# Patient Record
Sex: MALE | Race: WHITE | NOT HISPANIC OR LATINO | URBAN - METROPOLITAN AREA
[De-identification: names, ages, dates, MRNs, and addresses within clinical notes are randomized per-mention and may not be internally consistent; named-entity substitution may affect disease eponyms.]

---

## 2017-06-13 ENCOUNTER — OUTPATIENT (OUTPATIENT)
Dept: OUTPATIENT SERVICES | Facility: HOSPITAL | Age: 62
LOS: 1 days | End: 2017-06-13
Payer: COMMERCIAL

## 2017-06-13 VITALS
HEART RATE: 89 BPM | SYSTOLIC BLOOD PRESSURE: 116 MMHG | DIASTOLIC BLOOD PRESSURE: 81 MMHG | TEMPERATURE: 99 F | HEIGHT: 70 IN | RESPIRATION RATE: 16 BRPM | WEIGHT: 207.9 LBS

## 2017-06-13 DIAGNOSIS — Z90.49 ACQUIRED ABSENCE OF OTHER SPECIFIED PARTS OF DIGESTIVE TRACT: Chronic | ICD-10-CM

## 2017-06-13 DIAGNOSIS — M16.11 UNILATERAL PRIMARY OSTEOARTHRITIS, RIGHT HIP: ICD-10-CM

## 2017-06-13 DIAGNOSIS — Z86.69 PERSONAL HISTORY OF OTHER DISEASES OF THE NERVOUS SYSTEM AND SENSE ORGANS: Chronic | ICD-10-CM

## 2017-06-13 DIAGNOSIS — Z90.89 ACQUIRED ABSENCE OF OTHER ORGANS: Chronic | ICD-10-CM

## 2017-06-13 DIAGNOSIS — Z01.812 ENCOUNTER FOR PREPROCEDURAL LABORATORY EXAMINATION: ICD-10-CM

## 2017-06-13 LAB
ALBUMIN SERPL ELPH-MCNC: 4 G/DL — SIGNIFICANT CHANGE UP (ref 3.3–5)
ALP SERPL-CCNC: 95 U/L — SIGNIFICANT CHANGE UP (ref 40–120)
ALT FLD-CCNC: 17 U/L — SIGNIFICANT CHANGE UP (ref 12–78)
ANION GAP SERPL CALC-SCNC: 7 MMOL/L — SIGNIFICANT CHANGE UP (ref 5–17)
APTT BLD: 33 SEC — SIGNIFICANT CHANGE UP (ref 27.5–37.4)
AST SERPL-CCNC: 12 U/L — LOW (ref 15–37)
BILIRUB SERPL-MCNC: 0.4 MG/DL — SIGNIFICANT CHANGE UP (ref 0.2–1.2)
BUN SERPL-MCNC: 19 MG/DL — SIGNIFICANT CHANGE UP (ref 7–23)
CALCIUM SERPL-MCNC: 9.5 MG/DL — SIGNIFICANT CHANGE UP (ref 8.5–10.1)
CHLORIDE SERPL-SCNC: 108 MMOL/L — SIGNIFICANT CHANGE UP (ref 96–108)
CO2 SERPL-SCNC: 25 MMOL/L — SIGNIFICANT CHANGE UP (ref 22–31)
CREAT SERPL-MCNC: 0.9 MG/DL — SIGNIFICANT CHANGE UP (ref 0.5–1.3)
GLUCOSE SERPL-MCNC: 103 MG/DL — HIGH (ref 70–99)
HCT VFR BLD CALC: 44.7 % — SIGNIFICANT CHANGE UP (ref 39–50)
HGB BLD-MCNC: 15.4 G/DL — SIGNIFICANT CHANGE UP (ref 13–17)
INR BLD: 1.11 RATIO — SIGNIFICANT CHANGE UP (ref 0.88–1.16)
MCHC RBC-ENTMCNC: 32.8 PG — SIGNIFICANT CHANGE UP (ref 27–34)
MCHC RBC-ENTMCNC: 34.4 GM/DL — SIGNIFICANT CHANGE UP (ref 32–36)
MCV RBC AUTO: 95.2 FL — SIGNIFICANT CHANGE UP (ref 80–100)
MRSA PCR RESULT.: SIGNIFICANT CHANGE UP
PLATELET # BLD AUTO: 244 K/UL — SIGNIFICANT CHANGE UP (ref 150–400)
POTASSIUM SERPL-MCNC: 4.1 MMOL/L — SIGNIFICANT CHANGE UP (ref 3.5–5.3)
POTASSIUM SERPL-SCNC: 4.1 MMOL/L — SIGNIFICANT CHANGE UP (ref 3.5–5.3)
PROT SERPL-MCNC: 7.4 G/DL — SIGNIFICANT CHANGE UP (ref 6–8.3)
PROTHROM AB SERPL-ACNC: 12.1 SEC — SIGNIFICANT CHANGE UP (ref 9.8–12.7)
RBC # BLD: 4.69 M/UL — SIGNIFICANT CHANGE UP (ref 4.2–5.8)
RBC # FLD: 11.2 % — SIGNIFICANT CHANGE UP (ref 10.3–14.5)
S AUREUS DNA NOSE QL NAA+PROBE: SIGNIFICANT CHANGE UP
SODIUM SERPL-SCNC: 140 MMOL/L — SIGNIFICANT CHANGE UP (ref 135–145)
WBC # BLD: 6.5 K/UL — SIGNIFICANT CHANGE UP (ref 3.8–10.5)
WBC # FLD AUTO: 6.5 K/UL — SIGNIFICANT CHANGE UP (ref 3.8–10.5)

## 2017-06-13 PROCEDURE — 93010 ELECTROCARDIOGRAM REPORT: CPT

## 2017-06-13 PROCEDURE — 71020: CPT | Mod: 26

## 2017-06-13 PROCEDURE — 73502 X-RAY EXAM HIP UNI 2-3 VIEWS: CPT | Mod: 26,RT

## 2017-06-13 NOTE — H&P PST ADULT - ASSESSMENT
62 year old male with  Unilateral Primary Osteoarthritis; RIGHT Hip - scheduled for RIGHT Total Hip Replacement with Dr Peter Smalls on 6/29/17

## 2017-06-13 NOTE — H&P PST ADULT - NSANTHOSAYNRD_GEN_A_CORE
No. YARELIS screening performed.  STOP BANG Legend: 0-2 = LOW Risk; 3-4 = INTERMEDIATE Risk; 5-8 = HIGH Risk

## 2017-06-13 NOTE — H&P PST ADULT - MUSCULOSKELETAL COMMENTS
RIGHT Hip Pain , decreased ROM, Diminished Strength - also left torn rotator cuff - notes better ROM since been going to PT decreased ROM and strength in right hip

## 2017-06-13 NOTE — H&P PST ADULT - NEGATIVE ENMT SYMPTOMS
no nasal discharge/no sinus symptoms/no vertigo/no nasal obstruction/no hearing difficulty/no nasal congestion

## 2017-06-13 NOTE — H&P PST ADULT - PMH
Tear of left rotator cuff, unspecified tear extent  LEFT Shoulder  Unilateral primary osteoarthritis, right hip

## 2017-06-13 NOTE — H&P PST ADULT - MUSCULOSKELETAL
details… detailed exam no calf tenderness/diminished strength/no joint warmth/decreased ROM due to pain/no joint erythema

## 2017-06-13 NOTE — H&P PST ADULT - HISTORY OF PRESENT ILLNESS
62 year old male presents for PST prior to RIGHT Total Hip Replacement with Dr Peter Smalls on 6/29/17 - pt notes over the last 8-9 months he has developed arthritis in his right hip which he notes is "bone on bone nothing left in the socket." Notes pain #7/10 on pain scale - uses Tylenol for pain when needed - notes decreased ROM and diminished strength in right hip - started using cane few days ago for ambulation assistance -  following consult with Dr Smalls pt is electing for scheduled procedure.

## 2017-06-13 NOTE — H&P PST ADULT - PAIN COMMENT, PROFILE
pt notes he has been going to PT 6 days a week for hip and shoulder - also notes taking Dicflonec 3 times daily for pain

## 2017-06-13 NOTE — H&P PST ADULT - FAMILY HISTORY
Mother  Still living? No  Family history of dementia, Age at diagnosis: Age Unknown  Hypertension, Age at diagnosis: Age Unknown     Father  Still living? Yes, Estimated age: 81-90  Hypertension, Age at diagnosis: Age Unknown  Family history of dementia, Age at diagnosis: Age Unknown  Family history of Parkinson's disease, Age at diagnosis: Age Unknown

## 2017-06-13 NOTE — H&P PST ADULT - VISION (WITH CORRECTIVE LENSES IF THE PATIENT USUALLY WEARS THEM):
wears RX Eyeglasses for reading/Partially impaired: cannot see medication labels or newsprint, but can see obstacles in path, and the surrounding layout; can count fingers at arm's length

## 2017-06-13 NOTE — H&P PST ADULT - PROBLEM SELECTOR PLAN 1
PST Labs; CBC, CMP, Coags, Type & Screen, EKG, CXR, Nose Culture for MRSA/MSSA, RIGHT Hip Xrays - medical clearance appt scheduled for 6/14/17 with Dr Swain - pre-op instructions as well as pre-op wash instructions given to pt with understanding verbalized - pt encouraged to attend joint replacement class - pt not interested in attending class at this time

## 2017-06-28 PROCEDURE — 87640 STAPH A DNA AMP PROBE: CPT

## 2017-06-28 PROCEDURE — 86901 BLOOD TYPING SEROLOGIC RH(D): CPT

## 2017-06-28 PROCEDURE — 85730 THROMBOPLASTIN TIME PARTIAL: CPT

## 2017-06-28 PROCEDURE — 86920 COMPATIBILITY TEST SPIN: CPT

## 2017-06-28 PROCEDURE — 86900 BLOOD TYPING SEROLOGIC ABO: CPT

## 2017-06-28 PROCEDURE — 86850 RBC ANTIBODY SCREEN: CPT

## 2017-06-28 PROCEDURE — 87641 MR-STAPH DNA AMP PROBE: CPT

## 2017-06-28 PROCEDURE — 85610 PROTHROMBIN TIME: CPT

## 2017-06-28 PROCEDURE — 93005 ELECTROCARDIOGRAM TRACING: CPT

## 2017-06-28 PROCEDURE — 85027 COMPLETE CBC AUTOMATED: CPT

## 2017-06-28 PROCEDURE — G0463: CPT

## 2017-06-28 PROCEDURE — 80053 COMPREHEN METABOLIC PANEL: CPT

## 2017-06-28 PROCEDURE — 71046 X-RAY EXAM CHEST 2 VIEWS: CPT

## 2017-06-28 PROCEDURE — 73502 X-RAY EXAM HIP UNI 2-3 VIEWS: CPT

## 2017-06-28 RX ORDER — SODIUM CHLORIDE 9 MG/ML
1000 INJECTION, SOLUTION INTRAVENOUS
Qty: 0 | Refills: 0 | Status: DISCONTINUED | OUTPATIENT
Start: 2017-06-29 | End: 2017-06-29

## 2017-06-29 ENCOUNTER — INPATIENT (INPATIENT)
Facility: HOSPITAL | Age: 62
LOS: 3 days | Discharge: NURSING, REHAB OR LONG-TERM CARE | DRG: 470 | End: 2017-07-03
Attending: HOSPITALIST | Admitting: SPECIALIST
Payer: COMMERCIAL

## 2017-06-29 VITALS
HEART RATE: 90 BPM | WEIGHT: 207.9 LBS | SYSTOLIC BLOOD PRESSURE: 130 MMHG | TEMPERATURE: 98 F | RESPIRATION RATE: 19 BRPM | DIASTOLIC BLOOD PRESSURE: 88 MMHG | OXYGEN SATURATION: 95 % | HEIGHT: 70 IN

## 2017-06-29 DIAGNOSIS — Z90.49 ACQUIRED ABSENCE OF OTHER SPECIFIED PARTS OF DIGESTIVE TRACT: Chronic | ICD-10-CM

## 2017-06-29 DIAGNOSIS — Z86.69 PERSONAL HISTORY OF OTHER DISEASES OF THE NERVOUS SYSTEM AND SENSE ORGANS: Chronic | ICD-10-CM

## 2017-06-29 DIAGNOSIS — M16.11 UNILATERAL PRIMARY OSTEOARTHRITIS, RIGHT HIP: ICD-10-CM

## 2017-06-29 DIAGNOSIS — Z90.89 ACQUIRED ABSENCE OF OTHER ORGANS: Chronic | ICD-10-CM

## 2017-06-29 LAB
ABO RH CONFIRMATION: SIGNIFICANT CHANGE UP
ANION GAP SERPL CALC-SCNC: 6 MMOL/L — SIGNIFICANT CHANGE UP (ref 5–17)
BUN SERPL-MCNC: 12 MG/DL — SIGNIFICANT CHANGE UP (ref 7–23)
CALCIUM SERPL-MCNC: 8.2 MG/DL — LOW (ref 8.5–10.1)
CHLORIDE SERPL-SCNC: 107 MMOL/L — SIGNIFICANT CHANGE UP (ref 96–108)
CO2 SERPL-SCNC: 25 MMOL/L — SIGNIFICANT CHANGE UP (ref 22–31)
CREAT SERPL-MCNC: 1.2 MG/DL — SIGNIFICANT CHANGE UP (ref 0.5–1.3)
GLUCOSE SERPL-MCNC: 123 MG/DL — HIGH (ref 70–99)
HCT VFR BLD CALC: 42.3 % — SIGNIFICANT CHANGE UP (ref 39–50)
HGB BLD-MCNC: 14.3 G/DL — SIGNIFICANT CHANGE UP (ref 13–17)
MCHC RBC-ENTMCNC: 32.4 PG — SIGNIFICANT CHANGE UP (ref 27–34)
MCHC RBC-ENTMCNC: 33.8 GM/DL — SIGNIFICANT CHANGE UP (ref 32–36)
MCV RBC AUTO: 95.9 FL — SIGNIFICANT CHANGE UP (ref 80–100)
PLATELET # BLD AUTO: 246 K/UL — SIGNIFICANT CHANGE UP (ref 150–400)
POTASSIUM SERPL-MCNC: 4.7 MMOL/L — SIGNIFICANT CHANGE UP (ref 3.5–5.3)
POTASSIUM SERPL-SCNC: 4.7 MMOL/L — SIGNIFICANT CHANGE UP (ref 3.5–5.3)
RBC # BLD: 4.41 M/UL — SIGNIFICANT CHANGE UP (ref 4.2–5.8)
RBC # FLD: 11.5 % — SIGNIFICANT CHANGE UP (ref 10.3–14.5)
SODIUM SERPL-SCNC: 138 MMOL/L — SIGNIFICANT CHANGE UP (ref 135–145)
WBC # BLD: 13.4 K/UL — HIGH (ref 3.8–10.5)
WBC # FLD AUTO: 13.4 K/UL — HIGH (ref 3.8–10.5)

## 2017-06-29 PROCEDURE — 88311 DECALCIFY TISSUE: CPT | Mod: 26

## 2017-06-29 PROCEDURE — 88305 TISSUE EXAM BY PATHOLOGIST: CPT | Mod: 26

## 2017-06-29 PROCEDURE — 72170 X-RAY EXAM OF PELVIS: CPT | Mod: 26

## 2017-06-29 RX ORDER — OXYCODONE HYDROCHLORIDE 5 MG/1
10 TABLET ORAL EVERY 4 HOURS
Qty: 0 | Refills: 0 | Status: DISCONTINUED | OUTPATIENT
Start: 2017-06-29 | End: 2017-07-03

## 2017-06-29 RX ORDER — SENNA PLUS 8.6 MG/1
2 TABLET ORAL AT BEDTIME
Qty: 0 | Refills: 0 | Status: DISCONTINUED | OUTPATIENT
Start: 2017-06-29 | End: 2017-07-03

## 2017-06-29 RX ORDER — ACETAMINOPHEN 500 MG
650 TABLET ORAL EVERY 6 HOURS
Qty: 0 | Refills: 0 | Status: DISCONTINUED | OUTPATIENT
Start: 2017-06-29 | End: 2017-07-03

## 2017-06-29 RX ORDER — OXYCODONE HYDROCHLORIDE 5 MG/1
5 TABLET ORAL EVERY 4 HOURS
Qty: 0 | Refills: 0 | Status: DISCONTINUED | OUTPATIENT
Start: 2017-06-29 | End: 2017-07-03

## 2017-06-29 RX ORDER — HYDROMORPHONE HYDROCHLORIDE 2 MG/ML
0.5 INJECTION INTRAMUSCULAR; INTRAVENOUS; SUBCUTANEOUS
Qty: 0 | Refills: 0 | Status: DISCONTINUED | OUTPATIENT
Start: 2017-06-29 | End: 2017-06-29

## 2017-06-29 RX ORDER — CEFAZOLIN SODIUM 1 G
2000 VIAL (EA) INJECTION EVERY 8 HOURS
Qty: 0 | Refills: 0 | Status: COMPLETED | OUTPATIENT
Start: 2017-06-29 | End: 2017-06-30

## 2017-06-29 RX ORDER — CYCLOBENZAPRINE HYDROCHLORIDE 10 MG/1
5 TABLET, FILM COATED ORAL THREE TIMES A DAY
Qty: 0 | Refills: 0 | Status: DISCONTINUED | OUTPATIENT
Start: 2017-06-29 | End: 2017-06-30

## 2017-06-29 RX ORDER — BENZOCAINE AND MENTHOL 5; 1 G/100ML; G/100ML
1 LIQUID ORAL
Qty: 0 | Refills: 0 | Status: DISCONTINUED | OUTPATIENT
Start: 2017-06-29 | End: 2017-07-03

## 2017-06-29 RX ORDER — CEFAZOLIN SODIUM 1 G
2000 VIAL (EA) INJECTION ONCE
Qty: 0 | Refills: 0 | Status: COMPLETED | OUTPATIENT
Start: 2017-06-29 | End: 2017-06-29

## 2017-06-29 RX ORDER — ONDANSETRON 8 MG/1
4 TABLET, FILM COATED ORAL EVERY 6 HOURS
Qty: 0 | Refills: 0 | Status: DISCONTINUED | OUTPATIENT
Start: 2017-06-29 | End: 2017-07-03

## 2017-06-29 RX ORDER — HYDROMORPHONE HYDROCHLORIDE 2 MG/ML
0.5 INJECTION INTRAMUSCULAR; INTRAVENOUS; SUBCUTANEOUS
Qty: 0 | Refills: 0 | Status: DISCONTINUED | OUTPATIENT
Start: 2017-06-29 | End: 2017-07-03

## 2017-06-29 RX ORDER — BUPIVACAINE 13.3 MG/ML
20 INJECTION, SUSPENSION, LIPOSOMAL INFILTRATION ONCE
Qty: 0 | Refills: 0 | Status: DISCONTINUED | OUTPATIENT
Start: 2017-06-29 | End: 2017-06-29

## 2017-06-29 RX ORDER — DOCUSATE SODIUM 100 MG
100 CAPSULE ORAL THREE TIMES A DAY
Qty: 0 | Refills: 0 | Status: DISCONTINUED | OUTPATIENT
Start: 2017-06-29 | End: 2017-07-03

## 2017-06-29 RX ORDER — ONDANSETRON 8 MG/1
4 TABLET, FILM COATED ORAL ONCE
Qty: 0 | Refills: 0 | Status: DISCONTINUED | OUTPATIENT
Start: 2017-06-29 | End: 2017-06-29

## 2017-06-29 RX ORDER — ENOXAPARIN SODIUM 100 MG/ML
40 INJECTION SUBCUTANEOUS EVERY 24 HOURS
Qty: 0 | Refills: 0 | Status: DISCONTINUED | OUTPATIENT
Start: 2017-06-30 | End: 2017-07-03

## 2017-06-29 RX ORDER — MEPERIDINE HYDROCHLORIDE 50 MG/ML
12.5 INJECTION INTRAMUSCULAR; INTRAVENOUS; SUBCUTANEOUS
Qty: 0 | Refills: 0 | Status: DISCONTINUED | OUTPATIENT
Start: 2017-06-29 | End: 2017-06-29

## 2017-06-29 RX ORDER — SODIUM CHLORIDE 9 MG/ML
1000 INJECTION, SOLUTION INTRAVENOUS
Qty: 0 | Refills: 0 | Status: DISCONTINUED | OUTPATIENT
Start: 2017-06-29 | End: 2017-06-29

## 2017-06-29 RX ORDER — METOCLOPRAMIDE HCL 10 MG
10 TABLET ORAL ONCE
Qty: 0 | Refills: 0 | Status: DISCONTINUED | OUTPATIENT
Start: 2017-06-29 | End: 2017-06-29

## 2017-06-29 RX ORDER — OXYCODONE HYDROCHLORIDE 5 MG/1
5 TABLET ORAL EVERY 4 HOURS
Qty: 0 | Refills: 0 | Status: DISCONTINUED | OUTPATIENT
Start: 2017-06-29 | End: 2017-06-29

## 2017-06-29 RX ORDER — SODIUM CHLORIDE 9 MG/ML
1000 INJECTION INTRAMUSCULAR; INTRAVENOUS; SUBCUTANEOUS
Qty: 0 | Refills: 0 | Status: DISCONTINUED | OUTPATIENT
Start: 2017-06-29 | End: 2017-06-30

## 2017-06-29 RX ORDER — ACETAMINOPHEN 500 MG
1000 TABLET ORAL ONCE
Qty: 0 | Refills: 0 | Status: COMPLETED | OUTPATIENT
Start: 2017-06-29 | End: 2017-06-29

## 2017-06-29 RX ORDER — DIPHENHYDRAMINE HCL 50 MG
25 CAPSULE ORAL EVERY 4 HOURS
Qty: 0 | Refills: 0 | Status: DISCONTINUED | OUTPATIENT
Start: 2017-06-29 | End: 2017-07-03

## 2017-06-29 RX ORDER — POLYETHYLENE GLYCOL 3350 17 G/17G
17 POWDER, FOR SOLUTION ORAL DAILY
Qty: 0 | Refills: 0 | Status: DISCONTINUED | OUTPATIENT
Start: 2017-06-29 | End: 2017-07-03

## 2017-06-29 RX ORDER — MAGNESIUM HYDROXIDE 400 MG/1
30 TABLET, CHEWABLE ORAL DAILY
Qty: 0 | Refills: 0 | Status: DISCONTINUED | OUTPATIENT
Start: 2017-06-29 | End: 2017-07-03

## 2017-06-29 RX ORDER — DIPHENHYDRAMINE HCL 50 MG
50 CAPSULE ORAL AT BEDTIME
Qty: 0 | Refills: 0 | Status: DISCONTINUED | OUTPATIENT
Start: 2017-06-29 | End: 2017-07-03

## 2017-06-29 RX ADMIN — OXYCODONE HYDROCHLORIDE 10 MILLIGRAM(S): 5 TABLET ORAL at 21:30

## 2017-06-29 RX ADMIN — Medication 100 MILLIGRAM(S): at 17:49

## 2017-06-29 RX ADMIN — OXYCODONE HYDROCHLORIDE 10 MILLIGRAM(S): 5 TABLET ORAL at 22:00

## 2017-06-29 RX ADMIN — HYDROMORPHONE HYDROCHLORIDE 0.5 MILLIGRAM(S): 2 INJECTION INTRAMUSCULAR; INTRAVENOUS; SUBCUTANEOUS at 14:40

## 2017-06-29 RX ADMIN — Medication 1 TABLET(S): at 21:30

## 2017-06-29 RX ADMIN — SODIUM CHLORIDE 75 MILLILITER(S): 9 INJECTION, SOLUTION INTRAVENOUS at 14:18

## 2017-06-29 RX ADMIN — HYDROMORPHONE HYDROCHLORIDE 0.5 MILLIGRAM(S): 2 INJECTION INTRAMUSCULAR; INTRAVENOUS; SUBCUTANEOUS at 14:51

## 2017-06-29 RX ADMIN — HYDROMORPHONE HYDROCHLORIDE 0.5 MILLIGRAM(S): 2 INJECTION INTRAMUSCULAR; INTRAVENOUS; SUBCUTANEOUS at 14:29

## 2017-06-29 RX ADMIN — SODIUM CHLORIDE 75 MILLILITER(S): 9 INJECTION, SOLUTION INTRAVENOUS at 08:32

## 2017-06-29 RX ADMIN — SODIUM CHLORIDE 75 MILLILITER(S): 9 INJECTION INTRAMUSCULAR; INTRAVENOUS; SUBCUTANEOUS at 15:35

## 2017-06-29 RX ADMIN — HYDROMORPHONE HYDROCHLORIDE 0.5 MILLIGRAM(S): 2 INJECTION INTRAMUSCULAR; INTRAVENOUS; SUBCUTANEOUS at 15:08

## 2017-06-29 NOTE — PROGRESS NOTE ADULT - SUBJECTIVE AND OBJECTIVE BOX
pt s+e, pain control    Vital Signs Last 24 Hrs  T(C): 36.8 (29 Jun 2017 15:58), Max: 36.8 (29 Jun 2017 14:00)  T(F): 98.2 (29 Jun 2017 15:58), Max: 98.2 (29 Jun 2017 14:00)  HR: 75 (29 Jun 2017 15:58) (72 - 94)  BP: 107/71 (29 Jun 2017 15:58) (107/71 - 130/88)  BP(mean): --  RR: 16 (29 Jun 2017 15:58) (13 - 19)  SpO2: 98% (29 Jun 2017 15:58) (95% - 98%)    PE:  dressing c/d/i  ehl/fhl/ta/gs intact  silt l3-s1  compartment soft  +abduction pillow    AP 62yM s/p R SAMIR  -pain control  -dvt ppx  -wbat, pt  -abduction pillow  -posterior precautions

## 2017-06-29 NOTE — PATIENT PROFILE ADULT. - VISION (WITH CORRECTIVE LENSES IF THE PATIENT USUALLY WEARS THEM):
Partially impaired: cannot see medication labels or newsprint, but can see obstacles in path, and the surrounding layout; can count fingers at arm's length/wears RX Eyeglasses for reading

## 2017-06-30 LAB
ANION GAP SERPL CALC-SCNC: 8 MMOL/L — SIGNIFICANT CHANGE UP (ref 5–17)
BUN SERPL-MCNC: 12 MG/DL — SIGNIFICANT CHANGE UP (ref 7–23)
CALCIUM SERPL-MCNC: 8.4 MG/DL — LOW (ref 8.5–10.1)
CHLORIDE SERPL-SCNC: 104 MMOL/L — SIGNIFICANT CHANGE UP (ref 96–108)
CO2 SERPL-SCNC: 27 MMOL/L — SIGNIFICANT CHANGE UP (ref 22–31)
CREAT SERPL-MCNC: 1.1 MG/DL — SIGNIFICANT CHANGE UP (ref 0.5–1.3)
GLUCOSE SERPL-MCNC: 105 MG/DL — HIGH (ref 70–99)
HCT VFR BLD CALC: 37.2 % — LOW (ref 39–50)
HGB BLD-MCNC: 12.6 G/DL — LOW (ref 13–17)
MCHC RBC-ENTMCNC: 33 PG — SIGNIFICANT CHANGE UP (ref 27–34)
MCHC RBC-ENTMCNC: 34 GM/DL — SIGNIFICANT CHANGE UP (ref 32–36)
MCV RBC AUTO: 97.1 FL — SIGNIFICANT CHANGE UP (ref 80–100)
PLATELET # BLD AUTO: 226 K/UL — SIGNIFICANT CHANGE UP (ref 150–400)
POTASSIUM SERPL-MCNC: 4 MMOL/L — SIGNIFICANT CHANGE UP (ref 3.5–5.3)
POTASSIUM SERPL-SCNC: 4 MMOL/L — SIGNIFICANT CHANGE UP (ref 3.5–5.3)
RBC # BLD: 3.83 M/UL — LOW (ref 4.2–5.8)
RBC # FLD: 11.8 % — SIGNIFICANT CHANGE UP (ref 10.3–14.5)
SODIUM SERPL-SCNC: 139 MMOL/L — SIGNIFICANT CHANGE UP (ref 135–145)
WBC # BLD: 13.5 K/UL — HIGH (ref 3.8–10.5)
WBC # FLD AUTO: 13.5 K/UL — HIGH (ref 3.8–10.5)

## 2017-06-30 RX ORDER — DIAZEPAM 5 MG
5 TABLET ORAL EVERY 8 HOURS
Qty: 0 | Refills: 0 | Status: DISCONTINUED | OUTPATIENT
Start: 2017-06-30 | End: 2017-07-03

## 2017-06-30 RX ORDER — DOCUSATE SODIUM 100 MG
1 CAPSULE ORAL
Qty: 30 | Refills: 0 | OUTPATIENT
Start: 2017-06-30 | End: 2017-07-10

## 2017-06-30 RX ORDER — OXYCODONE HYDROCHLORIDE 5 MG/1
1 TABLET ORAL
Qty: 28 | Refills: 0 | OUTPATIENT
Start: 2017-06-30 | End: 2017-07-07

## 2017-06-30 RX ORDER — ENOXAPARIN SODIUM 100 MG/ML
40 INJECTION SUBCUTANEOUS
Qty: 30 | Refills: 0 | OUTPATIENT
Start: 2017-06-30 | End: 2017-07-30

## 2017-06-30 RX ORDER — DICLOFENAC SODIUM 75 MG/1
1 TABLET, DELAYED RELEASE ORAL
Qty: 0 | Refills: 0 | COMMUNITY

## 2017-06-30 RX ADMIN — OXYCODONE HYDROCHLORIDE 10 MILLIGRAM(S): 5 TABLET ORAL at 08:54

## 2017-06-30 RX ADMIN — Medication 100 MILLIGRAM(S): at 01:37

## 2017-06-30 RX ADMIN — HYDROMORPHONE HYDROCHLORIDE 0.5 MILLIGRAM(S): 2 INJECTION INTRAMUSCULAR; INTRAVENOUS; SUBCUTANEOUS at 21:37

## 2017-06-30 RX ADMIN — OXYCODONE HYDROCHLORIDE 10 MILLIGRAM(S): 5 TABLET ORAL at 06:00

## 2017-06-30 RX ADMIN — Medication 1 TABLET(S): at 05:05

## 2017-06-30 RX ADMIN — Medication 650 MILLIGRAM(S): at 15:56

## 2017-06-30 RX ADMIN — HYDROMORPHONE HYDROCHLORIDE 0.5 MILLIGRAM(S): 2 INJECTION INTRAMUSCULAR; INTRAVENOUS; SUBCUTANEOUS at 13:20

## 2017-06-30 RX ADMIN — OXYCODONE HYDROCHLORIDE 10 MILLIGRAM(S): 5 TABLET ORAL at 09:30

## 2017-06-30 RX ADMIN — Medication 100 MILLIGRAM(S): at 21:23

## 2017-06-30 RX ADMIN — HYDROMORPHONE HYDROCHLORIDE 0.5 MILLIGRAM(S): 2 INJECTION INTRAMUSCULAR; INTRAVENOUS; SUBCUTANEOUS at 21:22

## 2017-06-30 RX ADMIN — Medication 1 TABLET(S): at 13:17

## 2017-06-30 RX ADMIN — Medication 5 MILLIGRAM(S): at 23:56

## 2017-06-30 RX ADMIN — HYDROMORPHONE HYDROCHLORIDE 0.5 MILLIGRAM(S): 2 INJECTION INTRAMUSCULAR; INTRAVENOUS; SUBCUTANEOUS at 12:41

## 2017-06-30 RX ADMIN — Medication 100 MILLIGRAM(S): at 13:16

## 2017-06-30 RX ADMIN — Medication 1 TABLET(S): at 21:23

## 2017-06-30 RX ADMIN — HYDROMORPHONE HYDROCHLORIDE 0.5 MILLIGRAM(S): 2 INJECTION INTRAMUSCULAR; INTRAVENOUS; SUBCUTANEOUS at 17:50

## 2017-06-30 RX ADMIN — Medication 100 MILLIGRAM(S): at 05:05

## 2017-06-30 RX ADMIN — ENOXAPARIN SODIUM 40 MILLIGRAM(S): 100 INJECTION SUBCUTANEOUS at 13:16

## 2017-06-30 RX ADMIN — Medication 1 TABLET(S): at 11:50

## 2017-06-30 RX ADMIN — POLYETHYLENE GLYCOL 3350 17 GRAM(S): 17 POWDER, FOR SOLUTION ORAL at 11:50

## 2017-06-30 RX ADMIN — OXYCODONE HYDROCHLORIDE 10 MILLIGRAM(S): 5 TABLET ORAL at 05:04

## 2017-06-30 NOTE — PROGRESS NOTE ADULT - SUBJECTIVE AND OBJECTIVE BOX
Pt S&E. Pain controlled. No acute events overnight    Vital Signs Last 24 Hrs  T(C): 36.4 (06-30-17 @ 04:49), Max: 36.8 (06-29-17 @ 14:00)  T(F): 97.5 (06-30-17 @ 04:49), Max: 98.2 (06-29-17 @ 14:00)  HR: 80 (06-30-17 @ 04:49) (70 - 94)  BP: 103/70 (06-30-17 @ 04:49) (99/63 - 130/88)  BP(mean): --  RR: 16 (06-30-17 @ 04:49) (13 - 19)  SpO2: 96% (06-30-17 @ 04:49) (95% - 98%)    Gen: NAD  RLE:  Dsg c/d/i  SILT L2-S1  +EHL/FHL/TA/Gastroc  DP+  Soft compartments, - calf ttp

## 2017-06-30 NOTE — DISCHARGE NOTE ADULT - MEDICATION SUMMARY - MEDICATIONS TO TAKE
I will START or STAY ON the medications listed below when I get home from the hospital:    acetaminophen-oxyCODONE 325 mg-5 mg oral tablet  -- 1 tab(s) by mouth every 6 hours, As Needed MDD:4 for pain  -- Caution federal law prohibits the transfer of this drug to any person other  than the person for whom it was prescribed.  May cause drowsiness.  Alcohol may intensify this effect.  Use care when operating dangerous machinery.  This prescription cannot be refilled.  This product contains acetaminophen.  Do not use  with any other product containing acetaminophen to prevent possible liver damage.  Using more of this medication than prescribed may cause serious breathing problems.    -- Indication: For Prn pain    enoxaparin 40 mg/0.4 mL injectable solution  -- 40 milligram(s) injectable once a day MDD:1 x30 days  -- It is very important that you take or use this exactly as directed.  Do not skip doses or discontinue unless directed by your doctor.    -- Indication: For dvt ppx    garlic oral tablet  -- 1 tab(s) by mouth once a day  -- Indication: For Per pmd    Colace sodium 100 mg oral capsule  -- 1 cap(s) by mouth 3 times a day, As Needed constipation  -- Medication should be taken with plenty of water.    -- Indication: For Prn constipation    Centrum oral tablet  -- 1 tab(s) by mouth once a day  -- Indication: For Per pmd I will START or STAY ON the medications listed below when I get home from the hospital:    Percocet 5/325 oral tablet  -- 1 tab(s) by mouth every 6 hours, As Needed pain  -- Indication: For Pain    Lovenox 40 mg/0.4 mL injectable solution  -- 1 unit(s) injectable once a day for 30 days for DVT prophylaxis.  Do not skip doses.  -- Indication: For DVT ppx    garlic oral tablet  -- 1 tab(s) by mouth once a day  -- Indication: For Per pmd    Colace sodium 100 mg oral capsule  -- 1 cap(s) by mouth 3 times a day, As Needed constipation  -- Medication should be taken with plenty of water.    -- Indication: For Prn constipation    Centrum oral tablet  -- 1 tab(s) by mouth once a day  -- Indication: For Per pmd

## 2017-06-30 NOTE — DISCHARGE NOTE ADULT - PATIENT PORTAL LINK FT
“You can access the [employer] Patient Portal, offered by [facility], by registering with the following website: [email or web address]”

## 2017-06-30 NOTE — DISCHARGE NOTE ADULT - HOSPITAL COURSE
The patient is a 62F status post elective total hip arthroplasty to the right hip after failing outpatient nonoperative conservative management.  Patient presented to [facility] after being medically cleared for an elective surgical procedure. The patient was taken to the operating room on date mentioned above. Prophylactic antibiotics were started before the procedure and continued for 24 hours.  There were no complications during the procedure and patient tolerated the procedure well.  The patient was transferred to recovery room in stable condition and subsequently to surgical floor.  Patient was placed on Lovenox for anticoagulation.  All home medications were continued.  The patient received physical therapy daily and daily labs were followed.  The dressing was kept clean, dry, and intact. The rest of the hospital stay was unremarkable. The patient is a 62M status post elective total hip arthroplasty to the right hip after failing outpatient nonoperative conservative management.  Patient presented to [facility] after being medically cleared for an elective surgical procedure. The patient was taken to the operating room on date mentioned above. Prophylactic antibiotics were started before the procedure and continued for 24 hours.  There were no complications during the procedure and patient tolerated the procedure well.  The patient was transferred to recovery room in stable condition and subsequently to surgical floor.  Patient was placed on Lovenox for anticoagulation.  All home medications were continued.  The patient received physical therapy daily and daily labs were followed.  The dressing was kept clean, dry, and intact. The rest of the hospital stay was unremarkable.

## 2017-06-30 NOTE — DISCHARGE NOTE ADULT - PLAN OF CARE
Return to baseline ADLs 1.	Pain control  2.	Walking with full weight bearing as tolerated, with assistive devices as needed.  3.	DVT prophylaxis  4.	Physical therapy as needed  5.	Follow up with Dr. Smalls as outpatient in 10-14 Days after discharge from the hospital or rehab. Call office for appointment.  6.	Remove old and place new Aquacel bandage every 7 days until staples removed. Remove staples post-op day 14.  7.	Ice and elevate affected area as needed  8.	Keep dressing clean, dry and intact  9.	Posterior hip precautions - Abduction pillow while seated or supine. Do not bend hip past 90 degrees. Do not turn knee/foot inward. Do not cross leg past middle of your body

## 2017-06-30 NOTE — DISCHARGE NOTE ADULT - CARE PLAN
Principal Discharge DX:	Unilateral primary osteoarthritis, right hip  Goal:	Return to baseline ADLs  Instructions for follow-up, activity and diet:	1.	Pain control  2.	Walking with full weight bearing as tolerated, with assistive devices as needed.  3.	DVT prophylaxis  4.	Physical therapy as needed  5.	Follow up with Dr. Smalls as outpatient in 10-14 Days after discharge from the hospital or rehab. Call office for appointment.  6.	Remove old and place new Aquacel bandage every 7 days until staples removed. Remove staples post-op day 14.  7.	Ice and elevate affected area as needed  8.	Keep dressing clean, dry and intact  9.	Posterior hip precautions - Abduction pillow while seated or supine. Do not bend hip past 90 degrees. Do not turn knee/foot inward. Do not cross leg past middle of your body

## 2017-06-30 NOTE — DISCHARGE NOTE ADULT - CARE PROVIDER_API CALL
Peter Smalls), Orthopaedic Surgery Surgery  [address] [place] [address]  Phone: [phone]  Fax: [phone]

## 2017-07-01 LAB
ANION GAP SERPL CALC-SCNC: 6 MMOL/L — SIGNIFICANT CHANGE UP (ref 5–17)
ANION GAP SERPL CALC-SCNC: 6 MMOL/L — SIGNIFICANT CHANGE UP (ref 5–17)
BASOPHILS # BLD AUTO: 0.1 K/UL — SIGNIFICANT CHANGE UP (ref 0–0.2)
BASOPHILS NFR BLD AUTO: 0.6 % — SIGNIFICANT CHANGE UP (ref 0–2)
BUN SERPL-MCNC: 12 MG/DL — SIGNIFICANT CHANGE UP (ref 7–23)
BUN SERPL-MCNC: 9 MG/DL — SIGNIFICANT CHANGE UP (ref 7–23)
CALCIUM SERPL-MCNC: 8.3 MG/DL — LOW (ref 8.5–10.1)
CALCIUM SERPL-MCNC: 8.5 MG/DL — SIGNIFICANT CHANGE UP (ref 8.5–10.1)
CHLORIDE SERPL-SCNC: 101 MMOL/L — SIGNIFICANT CHANGE UP (ref 96–108)
CHLORIDE SERPL-SCNC: 103 MMOL/L — SIGNIFICANT CHANGE UP (ref 96–108)
CK MB BLD-MCNC: 0.1 % — SIGNIFICANT CHANGE UP (ref 0–3.5)
CK MB CFR SERPL CALC: 0.7 NG/ML — SIGNIFICANT CHANGE UP (ref 0–3.6)
CK SERPL-CCNC: 695 U/L — HIGH (ref 26–308)
CO2 SERPL-SCNC: 28 MMOL/L — SIGNIFICANT CHANGE UP (ref 22–31)
CO2 SERPL-SCNC: 29 MMOL/L — SIGNIFICANT CHANGE UP (ref 22–31)
CREAT SERPL-MCNC: 0.77 MG/DL — SIGNIFICANT CHANGE UP (ref 0.5–1.3)
CREAT SERPL-MCNC: 0.8 MG/DL — SIGNIFICANT CHANGE UP (ref 0.5–1.3)
D DIMER BLD IA.RAPID-MCNC: 395 NG/ML DDU — HIGH
EOSINOPHIL # BLD AUTO: 0 K/UL — SIGNIFICANT CHANGE UP (ref 0–0.5)
EOSINOPHIL NFR BLD AUTO: 0.2 % — SIGNIFICANT CHANGE UP (ref 0–6)
GLUCOSE SERPL-MCNC: 105 MG/DL — HIGH (ref 70–99)
GLUCOSE SERPL-MCNC: 99 MG/DL — SIGNIFICANT CHANGE UP (ref 70–99)
HCT VFR BLD CALC: 36.2 % — LOW (ref 39–50)
HCT VFR BLD CALC: 37.6 % — LOW (ref 39–50)
HGB BLD-MCNC: 12.5 G/DL — LOW (ref 13–17)
HGB BLD-MCNC: 12.7 G/DL — LOW (ref 13–17)
LYMPHOCYTES # BLD AUTO: 2.5 K/UL — SIGNIFICANT CHANGE UP (ref 1–3.3)
LYMPHOCYTES # BLD AUTO: 24.8 % — SIGNIFICANT CHANGE UP (ref 13–44)
MAGNESIUM SERPL-MCNC: 1.9 MG/DL — SIGNIFICANT CHANGE UP (ref 1.6–2.6)
MCHC RBC-ENTMCNC: 32.6 PG — SIGNIFICANT CHANGE UP (ref 27–34)
MCHC RBC-ENTMCNC: 32.8 PG — SIGNIFICANT CHANGE UP (ref 27–34)
MCHC RBC-ENTMCNC: 33.8 GM/DL — SIGNIFICANT CHANGE UP (ref 32–36)
MCHC RBC-ENTMCNC: 34.5 GM/DL — SIGNIFICANT CHANGE UP (ref 32–36)
MCV RBC AUTO: 95 FL — SIGNIFICANT CHANGE UP (ref 80–100)
MCV RBC AUTO: 96.4 FL — SIGNIFICANT CHANGE UP (ref 80–100)
MONOCYTES # BLD AUTO: 1.2 K/UL — HIGH (ref 0–0.9)
MONOCYTES NFR BLD AUTO: 12 % — HIGH (ref 1–9)
NEUTROPHILS # BLD AUTO: 6.4 K/UL — SIGNIFICANT CHANGE UP (ref 1.8–7.4)
NEUTROPHILS NFR BLD AUTO: 62.4 % — SIGNIFICANT CHANGE UP (ref 43–77)
PHOSPHATE SERPL-MCNC: 2.2 MG/DL — LOW (ref 2.5–4.5)
PLATELET # BLD AUTO: 212 K/UL — SIGNIFICANT CHANGE UP (ref 150–400)
PLATELET # BLD AUTO: 216 K/UL — SIGNIFICANT CHANGE UP (ref 150–400)
POTASSIUM SERPL-MCNC: 3.7 MMOL/L — SIGNIFICANT CHANGE UP (ref 3.5–5.3)
POTASSIUM SERPL-MCNC: 3.8 MMOL/L — SIGNIFICANT CHANGE UP (ref 3.5–5.3)
POTASSIUM SERPL-SCNC: 3.7 MMOL/L — SIGNIFICANT CHANGE UP (ref 3.5–5.3)
POTASSIUM SERPL-SCNC: 3.8 MMOL/L — SIGNIFICANT CHANGE UP (ref 3.5–5.3)
RBC # BLD: 3.82 M/UL — LOW (ref 4.2–5.8)
RBC # BLD: 3.9 M/UL — LOW (ref 4.2–5.8)
RBC # FLD: 11.5 % — SIGNIFICANT CHANGE UP (ref 10.3–14.5)
RBC # FLD: 11.6 % — SIGNIFICANT CHANGE UP (ref 10.3–14.5)
SODIUM SERPL-SCNC: 136 MMOL/L — SIGNIFICANT CHANGE UP (ref 135–145)
SODIUM SERPL-SCNC: 137 MMOL/L — SIGNIFICANT CHANGE UP (ref 135–145)
TROPONIN I SERPL-MCNC: <.015 NG/ML — SIGNIFICANT CHANGE UP (ref 0.01–0.04)
TROPONIN I SERPL-MCNC: <.015 NG/ML — SIGNIFICANT CHANGE UP (ref 0.01–0.04)
WBC # BLD: 10.2 K/UL — SIGNIFICANT CHANGE UP (ref 3.8–10.5)
WBC # BLD: 11.2 K/UL — HIGH (ref 3.8–10.5)
WBC # FLD AUTO: 10.2 K/UL — SIGNIFICANT CHANGE UP (ref 3.8–10.5)
WBC # FLD AUTO: 11.2 K/UL — HIGH (ref 3.8–10.5)

## 2017-07-01 PROCEDURE — 71275 CT ANGIOGRAPHY CHEST: CPT | Mod: 26

## 2017-07-01 PROCEDURE — 93010 ELECTROCARDIOGRAM REPORT: CPT

## 2017-07-01 PROCEDURE — 99253 IP/OBS CNSLTJ NEW/EST LOW 45: CPT | Mod: GC

## 2017-07-01 RX ORDER — ASPIRIN/CALCIUM CARB/MAGNESIUM 324 MG
325 TABLET ORAL ONCE
Qty: 0 | Refills: 0 | Status: COMPLETED | OUTPATIENT
Start: 2017-07-01 | End: 2017-07-01

## 2017-07-01 RX ORDER — PANTOPRAZOLE SODIUM 20 MG/1
40 TABLET, DELAYED RELEASE ORAL ONCE
Qty: 0 | Refills: 0 | Status: COMPLETED | OUTPATIENT
Start: 2017-07-01 | End: 2017-07-01

## 2017-07-01 RX ADMIN — PANTOPRAZOLE SODIUM 40 MILLIGRAM(S): 20 TABLET, DELAYED RELEASE ORAL at 18:59

## 2017-07-01 RX ADMIN — OXYCODONE HYDROCHLORIDE 10 MILLIGRAM(S): 5 TABLET ORAL at 20:47

## 2017-07-01 RX ADMIN — Medication 100 MILLIGRAM(S): at 14:32

## 2017-07-01 RX ADMIN — Medication 1 TABLET(S): at 21:32

## 2017-07-01 RX ADMIN — Medication 1 TABLET(S): at 14:32

## 2017-07-01 RX ADMIN — HYDROMORPHONE HYDROCHLORIDE 0.5 MILLIGRAM(S): 2 INJECTION INTRAMUSCULAR; INTRAVENOUS; SUBCUTANEOUS at 02:08

## 2017-07-01 RX ADMIN — Medication 325 MILLIGRAM(S): at 16:47

## 2017-07-01 RX ADMIN — OXYCODONE HYDROCHLORIDE 10 MILLIGRAM(S): 5 TABLET ORAL at 19:47

## 2017-07-01 RX ADMIN — Medication 5 MILLIGRAM(S): at 08:06

## 2017-07-01 RX ADMIN — OXYCODONE HYDROCHLORIDE 10 MILLIGRAM(S): 5 TABLET ORAL at 10:17

## 2017-07-01 RX ADMIN — Medication 100 MILLIGRAM(S): at 21:32

## 2017-07-01 RX ADMIN — POLYETHYLENE GLYCOL 3350 17 GRAM(S): 17 POWDER, FOR SOLUTION ORAL at 14:32

## 2017-07-01 RX ADMIN — ENOXAPARIN SODIUM 40 MILLIGRAM(S): 100 INJECTION SUBCUTANEOUS at 14:32

## 2017-07-01 RX ADMIN — OXYCODONE HYDROCHLORIDE 10 MILLIGRAM(S): 5 TABLET ORAL at 05:39

## 2017-07-01 RX ADMIN — HYDROMORPHONE HYDROCHLORIDE 0.5 MILLIGRAM(S): 2 INJECTION INTRAMUSCULAR; INTRAVENOUS; SUBCUTANEOUS at 02:23

## 2017-07-01 RX ADMIN — Medication 5 MILLIGRAM(S): at 16:47

## 2017-07-01 RX ADMIN — Medication 1 TABLET(S): at 05:39

## 2017-07-01 RX ADMIN — OXYCODONE HYDROCHLORIDE 10 MILLIGRAM(S): 5 TABLET ORAL at 06:39

## 2017-07-01 RX ADMIN — Medication 100 MILLIGRAM(S): at 05:39

## 2017-07-01 RX ADMIN — OXYCODONE HYDROCHLORIDE 10 MILLIGRAM(S): 5 TABLET ORAL at 10:19

## 2017-07-01 NOTE — PROGRESS NOTE ADULT - ASSESSMENT
A/P:  62y Male sp R SAMIR POD 2  Pain control  DVT ppx  PT/WBAT/Posterior hip precautions/abduction pillow/OOB  FU labs  Incentive spirometry  Dispo planning

## 2017-07-01 NOTE — CHART NOTE - NSCHARTNOTEFT_GEN_A_CORE
CT Angio negative for PE. D-Dimer 395.  Spoke with Hospitalist Medicine Consult, Dr. Louis, aware.   Repeat troponins pending. 1st set negative.   Cardiology consult placed, Dr. Smalls.      Called placed to Dr. Smalls to give update.  Spoke with Dr. Smalls regarding CT Angio results he is aware.      LEIGH ANN Durbin, DO PGY-3

## 2017-07-01 NOTE — CHART NOTE - NSCHARTNOTEFT_GEN_A_CORE
Called by RN, patient noted to be having left-sided chest pain and facial flushing.      Patient seen and examined at bedside, states he has been experiencing dull, left sided chest pain for ~15 minutes without radiation.  States he also feels very warm.      A/P:  63 yo male POD #2   1) Chest Pain-will order EKG, cardiac enzymes, CBC, BMP, Mg, Phos.  Full dose aspirin.      Call placed to Dr. Smalls's answering service. Called by RN, patient noted to be having left-sided chest pain and facial flushing.      Patient seen and examined at bedside, states he has been experiencing dull, left sided chest pain for ~15 minutes without radiation.  States he also feels very warm.  Admits to occasional lightheadedness when he was sitting up in chair.  Denies pain other than chest and surgical site.     REVIEW OF SYSTEMS  Constitutional: feels warm  Respiratory: denies SOB, BLANKENSHIP, cough, sputum production  Cardiovascular: Admits CP; denies palpitations  Gastrointestinal: denies nausea, vomiting, diarrhea, constipation, abdominal pain  Genitourinary: denies dysuria, frequency, urgency, hematuria   Musculoskeletal: Chest pain, R hip pain  Neurologic: admits to occasional lightheadedness, headache    Vital Signs Last 24 Hrs  T(C): 37.3 (01 Jul 2017 15:36), Max: 37.3 (01 Jul 2017 00:05)  T(F): 99.1 (01 Jul 2017 15:36), Max: 99.1 (01 Jul 2017 00:05)  HR: 108 (01 Jul 2017 15:36) (86 - 108)  BP: 144/93 (01 Jul 2017 15:36) (108/68 - 146/86)  BP(mean): --  RR: 17 (01 Jul 2017 15:36) (16 - 17)  SpO2: 96% (01 Jul 2017 15:36) (95% - 100%)                          12.7   11.2  )-----------( 216      ( 01 Jul 2017 07:07 )             37.6     07-01    137  |  103  |  9   ----------------------------<  99  3.8   |  28  |  0.80    Ca    8.3<L>      01 Jul 2017 07:07    PE:  Physical Exam:  General: Well developed, well nourished, mild facial erythema  HEENT: NCAT  Neurology: A&Ox3,   Respiratory: CTA B/L, No W/R/R  CV: RRR, +S1/S2, no murmurs, rubs or gallops.  Tenderness to palpation over Left pectoral area.   Abdominal: Soft, NT, ND +BSx4  Extremities: No C/C/E, no calf tenderness.  MSK: Normal ROM UEs.  Pain not increased with UE movement. R hip dressing intact.     A/P:  61 yo male POD #2   1) Chest Pain-will order EKG, cardiac enzymes, CBC, BMP, Mg, Phos.  Full dose aspirin.  EKG appears similar to pre-op testing EKG (unofficial read).   Call placed to Dr. Smalls's answering service.  Spoke with Dr. Smalls-aware of chest pain;  would like hospitalist team for medicine consult.  Spoke with Dr. Collado, hospitalist, wants to add Cardiology consult.    LEIGH ANN Durbin  PGY-3 Called by RN, patient noted to be having left-sided chest pain and facial flushing.      Patient seen and examined at bedside, states he has been experiencing dull, left sided chest pain for ~15 minutes without radiation.  States he also feels very warm.  Admits to occasional lightheadedness when he was sitting up in chair.  Denies pain other than chest and surgical site.     REVIEW OF SYSTEMS  Constitutional: feels warm  Respiratory: denies SOB, BLANKENSHIP, cough, sputum production  Cardiovascular: Admits CP; denies palpitations  Gastrointestinal: denies nausea, vomiting, diarrhea, constipation, abdominal pain  Genitourinary: denies dysuria, frequency, urgency, hematuria   Musculoskeletal: Chest pain, R hip pain  Neurologic: admits to occasional lightheadedness, headache    Vital Signs Last 24 Hrs  T(C): 37.3 (01 Jul 2017 15:36), Max: 37.3 (01 Jul 2017 00:05)  T(F): 99.1 (01 Jul 2017 15:36), Max: 99.1 (01 Jul 2017 00:05)  HR: 108 (01 Jul 2017 15:36) (86 - 108)  BP: 144/93 (01 Jul 2017 15:36) (108/68 - 146/86)  BP(mean): --  RR: 17 (01 Jul 2017 15:36) (16 - 17)  SpO2: 96% (01 Jul 2017 15:36) (95% - 100%)                          12.7   11.2  )-----------( 216      ( 01 Jul 2017 07:07 )             37.6     07-01    137  |  103  |  9   ----------------------------<  99  3.8   |  28  |  0.80    Ca    8.3<L>      01 Jul 2017 07:07    PE:  Physical Exam:  General: Well developed, well nourished, mild facial erythema  HEENT: NCAT  Neurology: A&Ox3,   Respiratory: CTA B/L, No W/R/R  CV: RRR, +S1/S2, no murmurs, rubs or gallops.  Tenderness to palpation over Left pectoral area.   Abdominal: Soft, NT, ND +BSx4  Extremities: No C/C/E, no calf tenderness.  MSK: Normal ROM UEs.  Pain not increased with UE movement. R hip dressing intact.     A/P:  61 yo male POD #2 R total hip arthroplasty.    1) Chest Pain-will order EKG, cardiac enzymes, CBC, BMP, Mg, Phos.  Full dose aspirin.  EKG appears similar to pre-op testing EKG (unofficial read).   Call placed to Dr. Smalls's answering service.  Spoke with Dr. Smalls-aware of chest pain;  would like hospitalist team for medicine consult.  Spoke with Dr. Collado, hospitalist, wants to add Cardiology consult.    Spoke again with patient, has no cardiac history.  Has not seen a cardiologist in the past.  Does not remember Primary Care Physician's name.    Call placed to Dr. Smalls to give update regarding cardiology.      LEIGH ANN Durbin  PGY-3 Called by RN, patient noted to be having left-sided chest pain and facial flushing.      Patient seen and examined at bedside, states he has been experiencing dull, left sided chest pain for ~15 minutes without radiation.  States he also feels very warm.  Admits to occasional lightheadedness when he was sitting up in chair.  Denies pain other than chest and surgical site.     REVIEW OF SYSTEMS  Constitutional: feels warm  Respiratory: denies SOB, BLANKNESHIP, cough, sputum production  Cardiovascular: Admits CP; denies palpitations  Gastrointestinal: denies nausea, vomiting, diarrhea, constipation, abdominal pain  Genitourinary: denies dysuria, frequency, urgency, hematuria   Musculoskeletal: Chest pain, R hip pain  Neurologic: admits to occasional lightheadedness, headache    Vital Signs Last 24 Hrs  T(C): 37.3 (01 Jul 2017 15:36), Max: 37.3 (01 Jul 2017 00:05)  T(F): 99.1 (01 Jul 2017 15:36), Max: 99.1 (01 Jul 2017 00:05)  HR: 108 (01 Jul 2017 15:36) (86 - 108)  BP: 144/93 (01 Jul 2017 15:36) (108/68 - 146/86)  BP(mean): --  RR: 17 (01 Jul 2017 15:36) (16 - 17)  SpO2: 96% (01 Jul 2017 15:36) (95% - 100%)                          12.7   11.2  )-----------( 216      ( 01 Jul 2017 07:07 )             37.6     07-01    137  |  103  |  9   ----------------------------<  99  3.8   |  28  |  0.80    Ca    8.3<L>      01 Jul 2017 07:07    PE:  Physical Exam:  General: Well developed, well nourished, mild facial erythema  HEENT: NCAT  Neurology: A&Ox3,   Respiratory: CTA B/L, No W/R/R  CV: RRR, +S1/S2, no murmurs, rubs or gallops.  Tenderness to palpation over Left pectoral area.   Abdominal: Soft, NT, ND +BSx4  Extremities: No C/C/E, no calf tenderness.  MSK: Normal ROM UEs.  Pain not increased with UE movement. R hip dressing intact.     A/P:  63 yo male POD #2 R total hip arthroplasty.    1) Chest Pain-will order EKG, cardiac enzymes, CBC, BMP, Mg, Phos.  Full dose aspirin.  EKG appears similar to pre-op testing EKG (unofficial read).   Call placed to Dr. Smalls's answering service.  Spoke with Dr. Smalls-aware of chest pain;  would like hospitalist team for medicine consult.  Spoke with Dr. Collado, hospitalist, wants to add Cardiology consult.    Spoke again with patient, has no cardiac history.  Has not seen a cardiologist in the past.  Does not remember Primary Care Physician's name.    Call placed to Dr. Smalls to give update regarding cardiology consult.      LEIGH ANN Durbin  PGY-3    Addendum  Spoke with Dr. Collado that EKG appears similar to prior and that patient is describing pain as gas pain.  Will order 40mg IV Protonix per Dr. Collado. Called by RN, patient noted to be having left-sided chest pain and facial flushing.      Patient seen and examined at bedside, states he has been experiencing dull, left sided chest pain for ~15 minutes without radiation.  States he also feels very warm.  Admits to occasional lightheadedness when he was sitting up in chair.  Denies pain other than chest and surgical site. Admits to urinating without difficulty, burping and passing gas.  Has not had BM yet.     REVIEW OF SYSTEMS  Constitutional: feels warm  Respiratory: denies SOB, BLANKENSHIP, cough, sputum production  Cardiovascular: Admits CP; denies palpitations  Gastrointestinal: denies nausea, vomiting, diarrhea, constipation, abdominal pain  Genitourinary: denies dysuria, frequency, urgency, hematuria   Musculoskeletal: Chest pain, R hip pain  Neurologic: admits to occasional lightheadedness, headache    Vital Signs Last 24 Hrs  T(C): 37.3 (01 Jul 2017 15:36), Max: 37.3 (01 Jul 2017 00:05)  T(F): 99.1 (01 Jul 2017 15:36), Max: 99.1 (01 Jul 2017 00:05)  HR: 108 (01 Jul 2017 15:36) (86 - 108)  BP: 144/93 (01 Jul 2017 15:36) (108/68 - 146/86)  BP(mean): --  RR: 17 (01 Jul 2017 15:36) (16 - 17)  SpO2: 96% (01 Jul 2017 15:36) (95% - 100%)                          12.7   11.2  )-----------( 216      ( 01 Jul 2017 07:07 )             37.6     07-01    137  |  103  |  9   ----------------------------<  99  3.8   |  28  |  0.80    Ca    8.3<L>      01 Jul 2017 07:07    PE:  Physical Exam:  General: Well developed, well nourished, mild facial erythema  HEENT: NCAT  Neurology: A&Ox3,   Respiratory: CTA B/L, No W/R/R  CV: RRR, +S1/S2, no murmurs, rubs or gallops.  Tenderness to palpation over Left pectoral area.   Abdominal: Soft, NT, ND +BSx4  Extremities: No C/C/E, no calf tenderness.  MSK: Normal ROM UEs.  Pain not increased with UE movement. R hip dressing intact.     A/P:  61 yo male POD #2 R total hip arthroplasty.    1) Chest Pain-will order EKG, cardiac enzymes, CBC, BMP, Mg, Phos.  Full dose aspirin.  EKG appears similar to pre-op testing EKG (unofficial read).   Call placed to Dr. Smalls's answering service.  Spoke with Dr. Smalls-aware of chest pain;  would like hospitalist team for medicine consult.  Spoke with Dr. Collado, hospitalist, wants to add Cardiology consult.    Spoke again with patient, has no cardiac history.  Has not seen a cardiologist in the past.  Does not remember Primary Care Physician's name.    Call placed to Dr. Smalls to give update regarding cardiology consult.      LEIGH ANN Durbin  PGY-3    Addendum  Spoke with Dr. Collado that EKG appears similar to prior and that patient is describing pain as gas pain.  Will order 40mg IV Protonix per Dr. Collado.

## 2017-07-01 NOTE — PROGRESS NOTE ADULT - SUBJECTIVE AND OBJECTIVE BOX
Patient seen and examined. Pain controlled. No acute events overnight. Only ambulating small length with PT due to muscle spasms in hip/buttocks.    HEALTH ISSUES - PROBLEM Dx:  Unilateral primary osteoarthritis, right hip: Unilateral primary osteoarthritis, right hip        MEDICATIONS  (STANDING):  enoxaparin Injectable 40 milliGRAM(s) SubCutaneous every 24 hours  calcium carbonate 1250 mG + Vitamin D (OsCal 500 + D) 1 Tablet(s) Oral three times a day  polyethylene glycol 3350 17 Gram(s) Oral daily  docusate sodium 100 milliGRAM(s) Oral three times a day  multivitamin 1 Tablet(s) Oral daily    Allergies    No Known Allergies    Intolerances                            12.7   11.2  )-----------( 216      ( 01 Jul 2017 07:07 )             37.6     30 Jun 2017 06:44    139    |  104    |  12     ----------------------------<  105    4.0     |  27     |  1.10     Ca    8.4        30 Jun 2017 06:44        Vital Signs Last 24 Hrs  T(C): 37.2 (07-01-17 @ 05:10), Max: 38.4 (06-30-17 @ 15:52)  T(F): 98.9 (07-01-17 @ 05:10), Max: 101.1 (06-30-17 @ 15:52)  HR: 88 (07-01-17 @ 05:10) (80 - 96)  BP: 123/76 (07-01-17 @ 05:10) (99/65 - 146/86)  BP(mean): --  RR: 16 (07-01-17 @ 05:10) (16 - 16)  SpO2: 98% (07-01-17 @ 05:10) (94% - 100%)    Physical Exam  Gen: NAD  RLE:   Dressing c/d/i  +ehl/fhl/ta/gs function  L2-S1 silt  Dp/pt pulse intact  No calf ttp  Compartments soft    A/P:  62y Male sp R SAMIR POD 2  Pain control  DVT ppx  PT/WBAT/Posterior hip precautions/abduction pillow/OOB  FU labs  Incentive spirometry  Dispo planning

## 2017-07-02 DIAGNOSIS — R07.89 OTHER CHEST PAIN: ICD-10-CM

## 2017-07-02 DIAGNOSIS — R52 PAIN, UNSPECIFIED: ICD-10-CM

## 2017-07-02 DIAGNOSIS — Z96.641 PRESENCE OF RIGHT ARTIFICIAL HIP JOINT: ICD-10-CM

## 2017-07-02 LAB
ANION GAP SERPL CALC-SCNC: 9 MMOL/L — SIGNIFICANT CHANGE UP (ref 5–17)
BUN SERPL-MCNC: 10 MG/DL — SIGNIFICANT CHANGE UP (ref 7–23)
CALCIUM SERPL-MCNC: 8.5 MG/DL — SIGNIFICANT CHANGE UP (ref 8.5–10.1)
CHLORIDE SERPL-SCNC: 102 MMOL/L — SIGNIFICANT CHANGE UP (ref 96–108)
CO2 SERPL-SCNC: 26 MMOL/L — SIGNIFICANT CHANGE UP (ref 22–31)
CREAT SERPL-MCNC: 0.72 MG/DL — SIGNIFICANT CHANGE UP (ref 0.5–1.3)
GLUCOSE SERPL-MCNC: 100 MG/DL — HIGH (ref 70–99)
HCT VFR BLD CALC: 35.9 % — LOW (ref 39–50)
HGB BLD-MCNC: 12.4 G/DL — LOW (ref 13–17)
MCHC RBC-ENTMCNC: 32.6 PG — SIGNIFICANT CHANGE UP (ref 27–34)
MCHC RBC-ENTMCNC: 34.6 GM/DL — SIGNIFICANT CHANGE UP (ref 32–36)
MCV RBC AUTO: 94.2 FL — SIGNIFICANT CHANGE UP (ref 80–100)
PLATELET # BLD AUTO: 211 K/UL — SIGNIFICANT CHANGE UP (ref 150–400)
POTASSIUM SERPL-MCNC: 4.1 MMOL/L — SIGNIFICANT CHANGE UP (ref 3.5–5.3)
POTASSIUM SERPL-SCNC: 4.1 MMOL/L — SIGNIFICANT CHANGE UP (ref 3.5–5.3)
RBC # BLD: 3.81 M/UL — LOW (ref 4.2–5.8)
RBC # FLD: 11.1 % — SIGNIFICANT CHANGE UP (ref 10.3–14.5)
SODIUM SERPL-SCNC: 137 MMOL/L — SIGNIFICANT CHANGE UP (ref 135–145)
WBC # BLD: 10.1 K/UL — SIGNIFICANT CHANGE UP (ref 3.8–10.5)
WBC # FLD AUTO: 10.1 K/UL — SIGNIFICANT CHANGE UP (ref 3.8–10.5)

## 2017-07-02 PROCEDURE — 99232 SBSQ HOSP IP/OBS MODERATE 35: CPT

## 2017-07-02 PROCEDURE — 99223 1ST HOSP IP/OBS HIGH 75: CPT

## 2017-07-02 RX ADMIN — SENNA PLUS 2 TABLET(S): 8.6 TABLET ORAL at 21:22

## 2017-07-02 RX ADMIN — OXYCODONE HYDROCHLORIDE 10 MILLIGRAM(S): 5 TABLET ORAL at 06:20

## 2017-07-02 RX ADMIN — OXYCODONE HYDROCHLORIDE 10 MILLIGRAM(S): 5 TABLET ORAL at 20:15

## 2017-07-02 RX ADMIN — POLYETHYLENE GLYCOL 3350 17 GRAM(S): 17 POWDER, FOR SOLUTION ORAL at 11:42

## 2017-07-02 RX ADMIN — Medication 5 MILLIGRAM(S): at 00:06

## 2017-07-02 RX ADMIN — OXYCODONE HYDROCHLORIDE 10 MILLIGRAM(S): 5 TABLET ORAL at 11:42

## 2017-07-02 RX ADMIN — Medication 1 TABLET(S): at 21:22

## 2017-07-02 RX ADMIN — Medication 1 TABLET(S): at 11:42

## 2017-07-02 RX ADMIN — Medication 1 TABLET(S): at 16:47

## 2017-07-02 RX ADMIN — Medication 100 MILLIGRAM(S): at 16:47

## 2017-07-02 RX ADMIN — OXYCODONE HYDROCHLORIDE 10 MILLIGRAM(S): 5 TABLET ORAL at 05:20

## 2017-07-02 RX ADMIN — Medication 100 MILLIGRAM(S): at 05:20

## 2017-07-02 RX ADMIN — Medication 1 TABLET(S): at 05:20

## 2017-07-02 RX ADMIN — OXYCODONE HYDROCHLORIDE 10 MILLIGRAM(S): 5 TABLET ORAL at 15:30

## 2017-07-02 RX ADMIN — OXYCODONE HYDROCHLORIDE 10 MILLIGRAM(S): 5 TABLET ORAL at 19:31

## 2017-07-02 RX ADMIN — Medication 100 MILLIGRAM(S): at 21:21

## 2017-07-02 RX ADMIN — ENOXAPARIN SODIUM 40 MILLIGRAM(S): 100 INJECTION SUBCUTANEOUS at 16:47

## 2017-07-02 NOTE — PROGRESS NOTE ADULT - ASSESSMENT
63 yo male POD #2 s/p  R total hip arthroplasty  c/o chest 61 yo male POD #3 s/p  R total hip arthroplasty  c/o atypical  chest pain, right hip pain.

## 2017-07-02 NOTE — CONSULT NOTE ADULT - ASSESSMENT
62 year old male presents with no sig PMH, smoker now s/p  RIGHT Total Hip Replacement w cp  - CP is nonaginal in nature. Likely muscular. CE neg x 3. no ischemic ekg changes. Can check 2d echo (can be done as outpt)  - HR and Bp controlled  - Pain control  - Incentive Spirometry  - Monitor and replete electrolytes. Keep K>4.0 and Mg>2.0.  - Further cardiac workup will depend on clinical course.   - All other workup per primary team. Will followup.

## 2017-07-02 NOTE — PROGRESS NOTE ADULT - SUBJECTIVE AND OBJECTIVE BOX
Patient is a 62y old  Male who presents with a chief complaint of Pt states "I am having my RIGHT Hip Replaced." (30 Jun 2017 09:37)       INTERVAL HPI/OVERNIGHT EVENTS: Mild left sided chest pain upon deep breaths, and right hip pain. No bowel movement yet.    MEDICATIONS  (STANDING):  enoxaparin Injectable 40 milliGRAM(s) SubCutaneous every 24 hours  calcium carbonate 1250 mG + Vitamin D (OsCal 500 + D) 1 Tablet(s) Oral three times a day  polyethylene glycol 3350 17 Gram(s) Oral daily  docusate sodium 100 milliGRAM(s) Oral three times a day  multivitamin 1 Tablet(s) Oral daily    MEDICATIONS  (PRN):  acetaminophen   Tablet 650 milliGRAM(s) Oral every 6 hours PRN For Temp greater than 38 C (100.4 F)  acetaminophen   Tablet. 650 milliGRAM(s) Oral every 6 hours PRN headaches  oxyCODONE IR 5 milliGRAM(s) Oral every 4 hours PRN Mild Pain  oxyCODONE IR 10 milliGRAM(s) Oral every 4 hours PRN Moderate Pain  HYDROmorphone  Injectable 0.5 milliGRAM(s) IV Push every 3 hours PRN Severe Pain  aluminum hydroxide/magnesium hydroxide/simethicone Suspension 30 milliLiter(s) Oral four times a day PRN Indigestion  ondansetron Injectable 4 milliGRAM(s) IV Push every 6 hours PRN Nausea and/or Vomiting  diphenhydrAMINE   Capsule 50 milliGRAM(s) Oral at bedtime PRN Insomnia  bisacodyl Suppository 10 milliGRAM(s) Rectal daily PRN If no bowel movement by POD#2  senna 2 Tablet(s) Oral at bedtime PRN Constipation  magnesium hydroxide Suspension 30 milliLiter(s) Oral daily PRN Constipation  diphenhydrAMINE   Capsule 25 milliGRAM(s) Oral every 4 hours PRN Rash and/or Itching  benzocaine 15 mG/menthol 3.6 mG Lozenge 1 Lozenge Oral every 2 hours PRN Sore Throat  diazepam    Tablet 5 milliGRAM(s) Oral every 8 hours PRN muscle spasm      Allergies    No Known Allergies    Intolerances        REVIEW OF SYSTEMS:  CONSTITUTIONAL: No fever, weight loss, or fatigue  EYES: No eye pain, visual disturbances, or discharge  ENMT:  No difficulty hearing, tinnitus, vertigo; No sinus or throat pain  NECK: No pain or stiffness  RESPIRATORY: No cough, wheezing, chills or hemoptysis; No shortness of breath  CARDIOVASCULAR: No chest pain, palpitations, dizziness, or leg swelling  GASTROINTESTINAL: No abdominal or epigastric pain. No nausea, vomiting, or hematemesis; No diarrhea or constipation. No melena or hematochezia.  GENITOURINARY: No dysuria, frequency, hematuria, or incontinence  NEUROLOGICAL: No headaches, memory loss, loss of strength, numbness, or tremors  SKIN: No itching, burning, rashes, or lesions   LYMPH NODES: No enlarged glands  ENDOCRINE: No heat or cold intolerance; No hair loss; No polydipsia or polyuria  MUSCULOSKELETAL: No joint pain or swelling; No muscle, back, positive right hip pain.  PSYCHIATRIC: No depression, anxiety, mood swings, or difficulty sleeping  HEME/LYMPH: No easy bruising, or bleeding gums  ALLERGY AND IMMUNOLOGIC: No hives or eczema    Vital Signs Last 24 Hrs  T(C): 37.1 (02 Jul 2017 00:19), Max: 37.3 (01 Jul 2017 15:36)  T(F): 98.7 (02 Jul 2017 00:19), Max: 99.1 (01 Jul 2017 15:36)  HR: 91 (02 Jul 2017 00:19) (86 - 108)  BP: 111/76 (02 Jul 2017 00:19) (108/68 - 144/93)  BP(mean): --  RR: 16 (02 Jul 2017 00:19) (16 - 17)  SpO2: 96% (02 Jul 2017 00:19) (95% - 96%)    PHYSICAL EXAM:  GENERAL: NAD, well-groomed, well-developed  HEAD:  Atraumatic, Normocephalic  EYES: EOMI, PERRLA, conjunctiva and sclera clear  ENMT: No tonsillar erythema, exudates, or enlargement; Moist mucous membranes, No lesions  NECK: Supple, No JVD, Normal thyroid  NERVOUS SYSTEM:  Alert & Oriented X3, Good concentration; No focal deficits.  CHEST/LUNG: Decreased breath sounds  bilaterally; No rales, rhonchi, wheezing, or rubs  HEART: Regular rate and rhythm; No murmurs, rubs, or gallops  ABDOMEN: Soft, Nontender, Nondistended; Bowel sounds present  EXTREMITIES:  2+ Peripheral Pulses, No clubbing, cyanosis, or edema, S/P right THR.  LYMPH: No lymphadenopathy noted      LABS:                        12.5   10.2  )-----------( 212      ( 01 Jul 2017 16:49 )             36.2     01 Jul 2017 16:49    136    |  101    |  12     ----------------------------<  105    3.7     |  29     |  0.77     Ca    8.5        01 Jul 2017 16:49  Phos  2.2       01 Jul 2017 16:49  Mg     1.9       01 Jul 2017 16:49        CAPILLARY BLOOD GLUCOSE        BLOOD CULTURE    RADIOLOGY & ADDITIONAL TESTS:    Imaging Personally Reviewed:  [ ] YES     Consultant(s) Notes Reviewed:      Care Discussed with Consultants/Other Providers: Patient is a 62y old  Male who presents with a chief complaint of Pt states "I am having my RIGHT Hip Replaced." (30 Jun 2017 09:37)       INTERVAL HPI/OVERNIGHT EVENTS: Mild left sided chest pain upon deep breaths, and right hip pain. No bowel movement yet.    MEDICATIONS  (STANDING):  enoxaparin Injectable 40 milliGRAM(s) SubCutaneous every 24 hours  calcium carbonate 1250 mG + Vitamin D (OsCal 500 + D) 1 Tablet(s) Oral three times a day  polyethylene glycol 3350 17 Gram(s) Oral daily  docusate sodium 100 milliGRAM(s) Oral three times a day  multivitamin 1 Tablet(s) Oral daily    MEDICATIONS  (PRN):  acetaminophen   Tablet 650 milliGRAM(s) Oral every 6 hours PRN For Temp greater than 38 C (100.4 F)  acetaminophen   Tablet. 650 milliGRAM(s) Oral every 6 hours PRN headaches  oxyCODONE IR 5 milliGRAM(s) Oral every 4 hours PRN Mild Pain  oxyCODONE IR 10 milliGRAM(s) Oral every 4 hours PRN Moderate Pain  HYDROmorphone  Injectable 0.5 milliGRAM(s) IV Push every 3 hours PRN Severe Pain  aluminum hydroxide/magnesium hydroxide/simethicone Suspension 30 milliLiter(s) Oral four times a day PRN Indigestion  ondansetron Injectable 4 milliGRAM(s) IV Push every 6 hours PRN Nausea and/or Vomiting  diphenhydrAMINE   Capsule 50 milliGRAM(s) Oral at bedtime PRN Insomnia  bisacodyl Suppository 10 milliGRAM(s) Rectal daily PRN If no bowel movement by POD#2  senna 2 Tablet(s) Oral at bedtime PRN Constipation  magnesium hydroxide Suspension 30 milliLiter(s) Oral daily PRN Constipation  diphenhydrAMINE   Capsule 25 milliGRAM(s) Oral every 4 hours PRN Rash and/or Itching  benzocaine 15 mG/menthol 3.6 mG Lozenge 1 Lozenge Oral every 2 hours PRN Sore Throat  diazepam    Tablet 5 milliGRAM(s) Oral every 8 hours PRN muscle spasm      Allergies    No Known Allergies    Intolerances        REVIEW OF SYSTEMS:  CONSTITUTIONAL: No fever, weight loss, or fatigue  EYES: No eye pain, visual disturbances, or discharge  ENMT:  No difficulty hearing, tinnitus, vertigo; No sinus or throat pain  NECK: No pain or stiffness  RESPIRATORY: No cough, wheezing, chills or hemoptysis; No shortness of breath  CARDIOVASCULAR: Left sided pleuritic chest pain, No  palpitations, dizziness, or leg swelling  GASTROINTESTINAL: No abdominal or epigastric pain. No nausea, vomiting, or hematemesis; No diarrhea or constipation. No melena or hematochezia.  GENITOURINARY: No dysuria, frequency, hematuria, or incontinence  NEUROLOGICAL: No headaches, memory loss, loss of strength, numbness, or tremors  SKIN: No itching, burning, rashes, or lesions   LYMPH NODES: No enlarged glands  ENDOCRINE: No heat or cold intolerance; No hair loss; No polydipsia or polyuria  MUSCULOSKELETAL: No joint pain or swelling; No muscle, back, positive right hip pain.  PSYCHIATRIC: No depression, anxiety, mood swings, or difficulty sleeping  HEME/LYMPH: No easy bruising, or bleeding gums  ALLERGY AND IMMUNOLOGIC: No hives or eczema    Vital Signs Last 24 Hrs  T(C): 37.1 (02 Jul 2017 00:19), Max: 37.3 (01 Jul 2017 15:36)  T(F): 98.7 (02 Jul 2017 00:19), Max: 99.1 (01 Jul 2017 15:36)  HR: 91 (02 Jul 2017 00:19) (86 - 108)  BP: 111/76 (02 Jul 2017 00:19) (108/68 - 144/93)  BP(mean): --  RR: 16 (02 Jul 2017 00:19) (16 - 17)  SpO2: 96% (02 Jul 2017 00:19) (95% - 96%)    PHYSICAL EXAM:  GENERAL: NAD, well-groomed, well-developed  HEAD:  Atraumatic, Normocephalic  EYES: EOMI, PERRLA, conjunctiva and sclera clear  ENMT: No tonsillar erythema, exudates, or enlargement; Moist mucous membranes, No lesions  NECK: Supple, No JVD, Normal thyroid  NERVOUS SYSTEM:  Alert & Oriented X3, Good concentration; No focal deficits.  CHEST/LUNG: Decreased breath sounds  bilaterally; No rales, rhonchi, wheezing, or rubs  HEART: Regular rate and rhythm; No murmurs, rubs, or gallops  ABDOMEN: Soft, Nontender, Nondistended; Bowel sounds present  EXTREMITIES:  2+ Peripheral Pulses, No clubbing, cyanosis, or edema, S/P right THR.  LYMPH: No lymphadenopathy noted      LABS:                        12.5   10.2  )-----------( 212      ( 01 Jul 2017 16:49 )             36.2     01 Jul 2017 16:49    136    |  101    |  12     ----------------------------<  105    3.7     |  29     |  0.77     Ca    8.5        01 Jul 2017 16:49  Phos  2.2       01 Jul 2017 16:49  Mg     1.9       01 Jul 2017 16:49        CAPILLARY BLOOD GLUCOSE        BLOOD CULTURE    RADIOLOGY & ADDITIONAL TESTS:    Imaging Personally Reviewed:  [ ] YES     Consultant(s) Notes Reviewed:      Care Discussed with Consultants/Other Providers:

## 2017-07-02 NOTE — CONSULT NOTE ADULT - SUBJECTIVE AND OBJECTIVE BOX
Patient is c./o  left-sided chest pain  for the whole afternoon , and also increased pain while taking deep breath. .      Patient seen and examined at bedside, states he has been experiencing dull, left sided chest pain for ~15 minutes without radiation.  States he also feels very warm.  Admits to occasional lightheadedness when he was sitting up in chair.  Denies pain other than chest and surgical site. Admits to urinating without difficulty, burping and passing gas.  Has not had BM yet.     REVIEW OF SYSTEMS  Constitutional: feels warm  Respiratory: denies SOB, BLANKENSHIP, cough, sputum production  Cardiovascular: Admits CP; denies palpitations  Gastrointestinal: denies nausea, vomiting, diarrhea, constipation, abdominal pain  Genitourinary: denies dysuria, frequency, urgency, hematuria   Musculoskeletal: Chest pain, R hip pain  Neurologic: admits to occasional lightheadedness, headache    Vital Signs Last 24 Hrs  T(C): 37.3 (01 Jul 2017 15:36), Max: 37.3 (01 Jul 2017 00:05)  T(F): 99.1 (01 Jul 2017 15:36), Max: 99.1 (01 Jul 2017 00:05)  HR: 108 (01 Jul 2017 15:36) (86 - 108)  BP: 144/93 (01 Jul 2017 15:36) (108/68 - 146/86)  BP(mean): --  RR: 17 (01 Jul 2017 15:36) (16 - 17)  SpO2: 96% (01 Jul 2017 15:36) (95% - 100%)                          12.7   11.2  )-----------( 216      ( 01 Jul 2017 07:07 )             37.6     07-01    137  |  103  |  9   ----------------------------<  99  3.8   |  28  |  0.80    Ca    8.3<L>      01 Jul 2017 07:07  Physical Exam:  General: Well developed, well nourished, mild facial erythema  HEENT: no raised JVD  Respiratory:  B/L clear to Auscultation.   CV: RRR, +S1/S2, no murmurs, rubs or gallops.  Tenderness to palpation over Left chest wall .   Abdominal: Soft, NT, ND +BSx4  Neurology: A&Ox3, no focal neurology   Extremities: No Edema, no calf tenderness.  MSK: R hip dressing intact.     A/P:  61 yo male POD #2 s/p  R total hip arthroplasty  c/o chest     1) Chest Pain- likely from GERD , but has pleuritic component , get CT angio to r/o PE    EKG, cardiac enzymes, CBC, BMP, Mg, Phos.  Full dose aspirin.  EKG appears similar to pre-op testing EKG (unofficial read).   .   Dr. Smalls-aware of chest pain;     Cardiology consult.  C/w Protonix , maalox   Will follow up.
CHIEF COMPLAINT: Patient is a 62y old  Male who presents with a chief complaint of Pt states "I am having my RIGHT Hip Replaced." (2017 09:37)      HPI:  62 year old male presents with no sig PMH, smoker now s/p  RIGHT Total Hip Replacement. yesterday had a bout of left sided upper chest pain taht was dull, nonradiating, nonexertional, constant and last till today. Otherwise denies any   dyspnea, PND, orthopnea, near syncope, syncope, lower extremity edema, stroke like symptoms.   He had a CTPA that was negative for PE. He is currently asx except for hip pain.     EKG: SR with PAC    REVIEW OF SYSTEMS:   All other review of systems are negative unless indicated above    PAST MEDICAL & SURGICAL HISTORY:  Unilateral primary osteoarthritis, right hip  Tear of left rotator cuff, unspecified tear extent: LEFT Shoulder  S/P tonsillectomy  H/O cataract  S/P appendectomy: Ruptured Appendix       SOCIAL HISTORY:  No tobacco, ethanol, or drug abuse.    FAMILY HISTORY:  Family history of Parkinson's disease (Father): Father - Alive age 88  Family history of dementia (Father): Father - Alive age 88  Hypertension (Father): Father - Alive age 88  Hypertension (Mother): Mother -  age 86  Family history of dementia (Mother): Mother -  age 86    No family history of acute MI or sudden cardiac death.    MEDICATIONS  (STANDING):  enoxaparin Injectable 40 milliGRAM(s) SubCutaneous every 24 hours  calcium carbonate 1250 mG + Vitamin D (OsCal 500 + D) 1 Tablet(s) Oral three times a day  polyethylene glycol 3350 17 Gram(s) Oral daily  docusate sodium 100 milliGRAM(s) Oral three times a day  multivitamin 1 Tablet(s) Oral daily    MEDICATIONS  (PRN):  acetaminophen   Tablet 650 milliGRAM(s) Oral every 6 hours PRN For Temp greater than 38 C (100.4 F)  acetaminophen   Tablet. 650 milliGRAM(s) Oral every 6 hours PRN headaches  oxyCODONE IR 5 milliGRAM(s) Oral every 4 hours PRN Mild Pain  oxyCODONE IR 10 milliGRAM(s) Oral every 4 hours PRN Moderate Pain  HYDROmorphone  Injectable 0.5 milliGRAM(s) IV Push every 3 hours PRN Severe Pain  aluminum hydroxide/magnesium hydroxide/simethicone Suspension 30 milliLiter(s) Oral four times a day PRN Indigestion  ondansetron Injectable 4 milliGRAM(s) IV Push every 6 hours PRN Nausea and/or Vomiting  diphenhydrAMINE   Capsule 50 milliGRAM(s) Oral at bedtime PRN Insomnia  bisacodyl Suppository 10 milliGRAM(s) Rectal daily PRN If no bowel movement by POD#2  senna 2 Tablet(s) Oral at bedtime PRN Constipation  magnesium hydroxide Suspension 30 milliLiter(s) Oral daily PRN Constipation  diphenhydrAMINE   Capsule 25 milliGRAM(s) Oral every 4 hours PRN Rash and/or Itching  benzocaine 15 mG/menthol 3.6 mG Lozenge 1 Lozenge Oral every 2 hours PRN Sore Throat  diazepam    Tablet 5 milliGRAM(s) Oral every 8 hours PRN muscle spasm      Allergies    No Known Allergies    Intolerances            VITAL SIGNS:   Vital Signs Last 24 Hrs  T(C): 37.3 (2017 08:29), Max: 37.3 (2017 15:36)  T(F): 99.2 (2017 08:29), Max: 99.2 (2017 08:29)  HR: 88 (2017 08:29) (88 - 108)  BP: 94/69 (2017 08:29) (94/69 - 144/93)  BP(mean): --  RR: 17 (2017 08:29) (16 - 17)  SpO2: 97% (2017 08:29) (96% - 97%)    I&O's Summary    2017 07:01  -  2017 07:00  --------------------------------------------------------  IN: 0 mL / OUT: 1200 mL / NET: -1200 mL        On Exam:     Constitutional: NAD, awake and alert, well-developed  HEENT: Moist Mucous Membranes, Anicteric  Pulmonary: Non-labored, breath sounds are clear bilaterally, No wheezing, rales or rhonchi  Cardiovascular: Regular, S1 and S2, No murmurs, rubs, gallops or clicks  Gastrointestinal: Bowel Sounds present, soft, nontender.   Lymph: No peripheral edema. No lymphadenopathy.  Skin: No visible rashes or ulcers.  Psych:  Mood & affect appropriate    LABS: All Labs Reviewed:                        12.4   10.1  )-----------( 211      ( 2017 08:20 )             35.9                         12.5   10.2  )-----------( 212      ( 2017 16:49 )             36.2                         12.7   11.2  )-----------( 216      ( 2017 07:07 )             37.6     2017 08:20    137    |  102    |  10     ----------------------------<  100    4.1     |  26     |  0.72   2017 16:49    136    |  101    |  12     ----------------------------<  105    3.7     |  29     |  0.77   2017 07:07    137    |  103    |  9      ----------------------------<  99     3.8     |  28     |  0.80     Ca    8.5        2017 08:20  Ca    8.5        2017 16:49  Ca    8.3        2017 07:07  Phos  2.2       2017 16:49  Mg     1.9       2017 16:49        CARDIAC MARKERS ( 2017 08:20 )  <.015 ng/mL / x     / x     / x     / x      CARDIAC MARKERS ( 2017 23:34 )  <.015 ng/mL / x     / x     / x     / x      CARDIAC MARKERS ( 2017 16:49 )  <.015 ng/mL / x     / 695 U/L / x     / 0.7 ng/mL      Blood Culture:         RADIOLOGY:  < from: CT Angio Chest w/ IV Cont (17 @ 20:42) >    EXAM:  CT ANGIO CHEST (W)AW IC                            PROCEDURE DATE:  2017        INTERPRETATION:  Chest CT angiography (Pulmonary embolism protocol)    Indication:  Left-sided chest pain    Technique:  Axial postcontrast images were obtained during maximal   projected opacification of the pulmonary arterial vasculature. 92 cc of   Omnipaque 350 was administered intravenously without complication and 20   cc was discarded.  Reformatted coronal and sagittal images are submitted.    Axial and coronal MIP images are submitted.    Comparison:  none    FINDINGS:    Pulmonary arterial opacification is optimal.  There is no evidence of   pulmonary embolism.  The great vessels are not dilated.  There is no   aortic dissection. There is coronary atherosclerosis.    The visualized neck, axilla and subcutaneous tissues are unremarkable.    The tracheobronchial tree is patent centrally.  There is subcentimeter   mediastinal lymph nodes without significant mediastinal or hilar   adenopathy.      The heart is not enlarged.  There is no pericardial effusion.    There is bibasilar streaky atelectasis. There is no pleural abnormality.    The visualized bones and upper abdomen are unremarkable.    IMPRESSION:    No evidence of pulmonary embolism.  Bibasilar streaky atelectasis.              MIGUEL GLEASON M.D, ATTENDING RADIOLOGIST  This document has been electronically signed. 2017  9:12PM                < end of copied text >

## 2017-07-02 NOTE — PROGRESS NOTE ADULT - SUBJECTIVE AND OBJECTIVE BOX
Pt S/E at bedside, no acute events overnight, pain controlled    AVSS  Gen: NAD, AAOx3    Right Lower Extremity:  Dressing clean dry intact  +EHL/FHL/TA/GS  SILT L3-S1  +DP/PT Pulses  Compartments soft  No calf TTP B/L

## 2017-07-03 VITALS
SYSTOLIC BLOOD PRESSURE: 99 MMHG | DIASTOLIC BLOOD PRESSURE: 66 MMHG | TEMPERATURE: 99 F | RESPIRATION RATE: 16 BRPM | HEART RATE: 83 BPM | OXYGEN SATURATION: 97 %

## 2017-07-03 LAB
ANION GAP SERPL CALC-SCNC: 9 MMOL/L — SIGNIFICANT CHANGE UP (ref 5–17)
BUN SERPL-MCNC: 10 MG/DL — SIGNIFICANT CHANGE UP (ref 7–23)
CALCIUM SERPL-MCNC: 8.9 MG/DL — SIGNIFICANT CHANGE UP (ref 8.5–10.1)
CHLORIDE SERPL-SCNC: 101 MMOL/L — SIGNIFICANT CHANGE UP (ref 96–108)
CO2 SERPL-SCNC: 27 MMOL/L — SIGNIFICANT CHANGE UP (ref 22–31)
CREAT SERPL-MCNC: 0.72 MG/DL — SIGNIFICANT CHANGE UP (ref 0.5–1.3)
GLUCOSE SERPL-MCNC: 108 MG/DL — HIGH (ref 70–99)
HCT VFR BLD CALC: 35.7 % — LOW (ref 39–50)
HGB BLD-MCNC: 12.3 G/DL — LOW (ref 13–17)
MCHC RBC-ENTMCNC: 32.4 PG — SIGNIFICANT CHANGE UP (ref 27–34)
MCHC RBC-ENTMCNC: 34.4 GM/DL — SIGNIFICANT CHANGE UP (ref 32–36)
MCV RBC AUTO: 94.1 FL — SIGNIFICANT CHANGE UP (ref 80–100)
PLATELET # BLD AUTO: 254 K/UL — SIGNIFICANT CHANGE UP (ref 150–400)
POTASSIUM SERPL-MCNC: 3.9 MMOL/L — SIGNIFICANT CHANGE UP (ref 3.5–5.3)
POTASSIUM SERPL-SCNC: 3.9 MMOL/L — SIGNIFICANT CHANGE UP (ref 3.5–5.3)
RBC # BLD: 3.8 M/UL — LOW (ref 4.2–5.8)
RBC # FLD: 11.2 % — SIGNIFICANT CHANGE UP (ref 10.3–14.5)
SODIUM SERPL-SCNC: 137 MMOL/L — SIGNIFICANT CHANGE UP (ref 135–145)
SURGICAL PATHOLOGY FINAL REPORT - CH: SIGNIFICANT CHANGE UP
WBC # BLD: 8.1 K/UL — SIGNIFICANT CHANGE UP (ref 3.8–10.5)
WBC # FLD AUTO: 8.1 K/UL — SIGNIFICANT CHANGE UP (ref 3.8–10.5)

## 2017-07-03 PROCEDURE — 97162 PT EVAL MOD COMPLEX 30 MIN: CPT

## 2017-07-03 PROCEDURE — 88311 DECALCIFY TISSUE: CPT

## 2017-07-03 PROCEDURE — 97116 GAIT TRAINING THERAPY: CPT

## 2017-07-03 PROCEDURE — 82550 ASSAY OF CK (CPK): CPT

## 2017-07-03 PROCEDURE — 82553 CREATINE MB FRACTION: CPT

## 2017-07-03 PROCEDURE — 72170 X-RAY EXAM OF PELVIS: CPT

## 2017-07-03 PROCEDURE — 36415 COLL VENOUS BLD VENIPUNCTURE: CPT

## 2017-07-03 PROCEDURE — 99232 SBSQ HOSP IP/OBS MODERATE 35: CPT

## 2017-07-03 PROCEDURE — C1776: CPT

## 2017-07-03 PROCEDURE — 84484 ASSAY OF TROPONIN QUANT: CPT

## 2017-07-03 PROCEDURE — 84100 ASSAY OF PHOSPHORUS: CPT

## 2017-07-03 PROCEDURE — 80048 BASIC METABOLIC PNL TOTAL CA: CPT

## 2017-07-03 PROCEDURE — 85379 FIBRIN DEGRADATION QUANT: CPT

## 2017-07-03 PROCEDURE — 83735 ASSAY OF MAGNESIUM: CPT

## 2017-07-03 PROCEDURE — 88305 TISSUE EXAM BY PATHOLOGIST: CPT

## 2017-07-03 PROCEDURE — 71275 CT ANGIOGRAPHY CHEST: CPT

## 2017-07-03 PROCEDURE — 85027 COMPLETE CBC AUTOMATED: CPT

## 2017-07-03 PROCEDURE — 93005 ELECTROCARDIOGRAM TRACING: CPT

## 2017-07-03 RX ADMIN — POLYETHYLENE GLYCOL 3350 17 GRAM(S): 17 POWDER, FOR SOLUTION ORAL at 11:18

## 2017-07-03 RX ADMIN — Medication 1 TABLET(S): at 11:19

## 2017-07-03 RX ADMIN — HYDROMORPHONE HYDROCHLORIDE 0.5 MILLIGRAM(S): 2 INJECTION INTRAMUSCULAR; INTRAVENOUS; SUBCUTANEOUS at 05:56

## 2017-07-03 RX ADMIN — Medication 5 MILLIGRAM(S): at 00:03

## 2017-07-03 RX ADMIN — HYDROMORPHONE HYDROCHLORIDE 0.5 MILLIGRAM(S): 2 INJECTION INTRAMUSCULAR; INTRAVENOUS; SUBCUTANEOUS at 11:13

## 2017-07-03 RX ADMIN — ENOXAPARIN SODIUM 40 MILLIGRAM(S): 100 INJECTION SUBCUTANEOUS at 11:18

## 2017-07-03 RX ADMIN — HYDROMORPHONE HYDROCHLORIDE 0.5 MILLIGRAM(S): 2 INJECTION INTRAMUSCULAR; INTRAVENOUS; SUBCUTANEOUS at 06:16

## 2017-07-03 RX ADMIN — Medication 100 MILLIGRAM(S): at 05:56

## 2017-07-03 RX ADMIN — Medication 1 TABLET(S): at 05:56

## 2017-07-03 NOTE — PROGRESS NOTE ADULT - PROBLEM SELECTOR PLAN 3
Pain meds. as needed. Stool softeners while on narcotics. Dulcolax suppository as needed.
Pain meds. as needed. Stool softeners while on narcotics.

## 2017-07-03 NOTE — PROGRESS NOTE ADULT - ASSESSMENT
62 year old male presents with no sig PMH, smoker now s/p  RIGHT Total Hip Replacement w cp  - CP is nonaginal in nature. Likely muscular. CE neg x 3. no ischemic ekg changes. Can check 2d echo (can be done as outpt)  - HR and Bp controlled  - Pain control  - Incentive Spirometry  - Monitor and replete electrolytes. Keep K>4.0 and Mg>2.0.  - Further cardiac workup will depend on clinical course.   - All other workup per primary team.  - 4 subacute rehabilitation today

## 2017-07-03 NOTE — PROGRESS NOTE ADULT - SUBJECTIVE AND OBJECTIVE BOX
Patient is a 62y old  Male who presents with a chief complaint of Pt states "I am having my RIGHT Hip Replaced." (30 Jun 2017 09:37)       INTERVAL HPI/OVERNIGHT EVENTS: No bowel movement yet. No other complaints.     MEDICATIONS  (STANDING):  enoxaparin Injectable 40 milliGRAM(s) SubCutaneous every 24 hours  calcium carbonate 1250 mG + Vitamin D (OsCal 500 + D) 1 Tablet(s) Oral three times a day  polyethylene glycol 3350 17 Gram(s) Oral daily  docusate sodium 100 milliGRAM(s) Oral three times a day  multivitamin 1 Tablet(s) Oral daily    MEDICATIONS  (PRN):  acetaminophen   Tablet 650 milliGRAM(s) Oral every 6 hours PRN For Temp greater than 38 C (100.4 F)  acetaminophen   Tablet. 650 milliGRAM(s) Oral every 6 hours PRN headaches  oxyCODONE IR 5 milliGRAM(s) Oral every 4 hours PRN Mild Pain  oxyCODONE IR 10 milliGRAM(s) Oral every 4 hours PRN Moderate Pain  HYDROmorphone  Injectable 0.5 milliGRAM(s) IV Push every 3 hours PRN Severe Pain  aluminum hydroxide/magnesium hydroxide/simethicone Suspension 30 milliLiter(s) Oral four times a day PRN Indigestion  ondansetron Injectable 4 milliGRAM(s) IV Push every 6 hours PRN Nausea and/or Vomiting  diphenhydrAMINE   Capsule 50 milliGRAM(s) Oral at bedtime PRN Insomnia  bisacodyl Suppository 10 milliGRAM(s) Rectal daily PRN If no bowel movement by POD#2  senna 2 Tablet(s) Oral at bedtime PRN Constipation  magnesium hydroxide Suspension 30 milliLiter(s) Oral daily PRN Constipation  diphenhydrAMINE   Capsule 25 milliGRAM(s) Oral every 4 hours PRN Rash and/or Itching  benzocaine 15 mG/menthol 3.6 mG Lozenge 1 Lozenge Oral every 2 hours PRN Sore Throat  diazepam    Tablet 5 milliGRAM(s) Oral every 8 hours PRN muscle spasm      Allergies    No Known Allergies    Intolerances        REVIEW OF SYSTEMS:  CONSTITUTIONAL: No fever, or fatigue  EYES: No eye pain, visual disturbances, or discharge  ENMT:  No difficulty hearing, tinnitus, vertigo; No sinus or throat pain  NECK: No pain or stiffness  RESPIRATORY: No cough, wheezing, chills or hemoptysis; No shortness of breath  CARDIOVASCULAR: No chest pain, palpitations, dizziness, or leg swelling  GASTROINTESTINAL: No abdominal or epigastric pain. No nausea, vomiting, or hematemesis; No diarrhea or constipation. No melena or hematochezia.  GENITOURINARY: No dysuria, frequency, hematuria, or incontinence  NEUROLOGICAL: No headaches, memory loss, loss of strength, numbness, or tremors  SKIN: No itching, burning, rashes, or lesions   LYMPH NODES: No enlarged glands  ENDOCRINE: No heat or cold intolerance; No hair loss; No polydipsia or polyuria  MUSCULOSKELETAL: No joint pain or swelling; No muscle, back, or extremity pain  PSYCHIATRIC: No depression, anxiety, mood swings, or difficulty sleeping  HEME/LYMPH: No easy bruising, or bleeding gums  ALLERGY AND IMMUNOLOGIC: No hives or eczema    Vital Signs Last 24 Hrs  T(C): 36.9 (03 Jul 2017 08:00), Max: 37.3 (02 Jul 2017 08:29)  T(F): 98.5 (03 Jul 2017 08:00), Max: 99.2 (02 Jul 2017 08:29)  HR: 91 (03 Jul 2017 08:00) (77 - 91)  BP: 96/56 (03 Jul 2017 08:00) (94/69 - 124/84)  BP(mean): --  RR: 18 (03 Jul 2017 08:00) (16 - 18)  SpO2: 96% (03 Jul 2017 08:00) (96% - 100%)    PHYSICAL EXAM:  GENERAL: NAD, well-groomed, well-developed  HEAD:  Atraumatic, Normocephalic  EYES: EOMI, PERRLA, conjunctiva and sclera clear  ENMT: No tonsillar erythema, exudates, or enlargement; Moist mucous membranes, Good dentition, No lesions  NECK: Supple, No JVD, Normal thyroid  NERVOUS SYSTEM:  Alert & Oriented X3, Good concentration; No focal deficits.   CHEST/LUNG: Clear to auscultation bilaterally; No rales, rhonchi, wheezing, or rubs  HEART: Regular rate and rhythm; No murmurs, rubs, or gallops  ABDOMEN: Soft, Nontender, Nondistended; Bowel sounds present  EXTREMITIES:  2+ Peripheral Pulses, No clubbing, cyanosis, or edema  LYMPH: No lymphadenopathy noted  SKIN: No rashes or lesions    LABS:                        12.3   8.1   )-----------( 254      ( 03 Jul 2017 07:10 )             35.7     03 Jul 2017 07:10    137    |  101    |  10     ----------------------------<  108    3.9     |  27     |  0.72     Ca    8.9        03 Jul 2017 07:10        CAPILLARY BLOOD GLUCOSE        BLOOD CULTURE    RADIOLOGY & ADDITIONAL TESTS:    Imaging Personally Reviewed:  [ ] YES     Consultant(s) Notes Reviewed:      Care Discussed with Consultants/Other Providers:

## 2017-07-03 NOTE — PROGRESS NOTE ADULT - PROBLEM SELECTOR PLAN 1
ACS ruled out. Likely musculoskeletal . EKG with no new changes, CE negative. CTA negative for PE. Recommend cardio consult.   Incentive Spirometry.
ACS ruled out. Likely secondary to atelectasis. EKG with no new changes, CE negative. CTA negative for PE. Recommend cardio consult. ASA, statins.   Incentive Spirometry.

## 2017-07-03 NOTE — PROGRESS NOTE ADULT - SUBJECTIVE AND OBJECTIVE BOX
[facility]    Ish Mancilla, Angela Mendoza, Slim Hogan      [phone]    CHIEF COMPLAINT: Patient is a 62y old  Male who presents with a chief complaint of Pt states "I am having my RIGHT Hip Replaced." (30 Jun 2017 09:37)      Follow Up: degenerative arthritis. Status post right total hip replacement.    Interim history:doing well. No further chest pain or shortness of breath. All troponins were negative    MEDICATIONS  (STANDING):  enoxaparin Injectable 40 milliGRAM(s) SubCutaneous every 24 hours  calcium carbonate 1250 mG + Vitamin D (OsCal 500 + D) 1 Tablet(s) Oral three times a day  polyethylene glycol 3350 17 Gram(s) Oral daily  docusate sodium 100 milliGRAM(s) Oral three times a day  multivitamin 1 Tablet(s) Oral daily    MEDICATIONS  (PRN):  acetaminophen   Tablet 650 milliGRAM(s) Oral every 6 hours PRN For Temp greater than 38 C (100.4 F)  acetaminophen   Tablet. 650 milliGRAM(s) Oral every 6 hours PRN headaches  oxyCODONE IR 5 milliGRAM(s) Oral every 4 hours PRN Mild Pain  oxyCODONE IR 10 milliGRAM(s) Oral every 4 hours PRN Moderate Pain  HYDROmorphone  Injectable 0.5 milliGRAM(s) IV Push every 3 hours PRN Severe Pain  aluminum hydroxide/magnesium hydroxide/simethicone Suspension 30 milliLiter(s) Oral four times a day PRN Indigestion  ondansetron Injectable 4 milliGRAM(s) IV Push every 6 hours PRN Nausea and/or Vomiting  diphenhydrAMINE   Capsule 50 milliGRAM(s) Oral at bedtime PRN Insomnia  bisacodyl Suppository 10 milliGRAM(s) Rectal daily PRN If no bowel movement by POD#2  senna 2 Tablet(s) Oral at bedtime PRN Constipation  magnesium hydroxide Suspension 30 milliLiter(s) Oral daily PRN Constipation  diphenhydrAMINE   Capsule 25 milliGRAM(s) Oral every 4 hours PRN Rash and/or Itching  benzocaine 15 mG/menthol 3.6 mG Lozenge 1 Lozenge Oral every 2 hours PRN Sore Throat  diazepam    Tablet 5 milliGRAM(s) Oral every 8 hours PRN muscle spasm      REVIEW OF SYSTEMS:  eye, ent, GI, , allergic, dermatologic, musculoskeletal and neurologic are negative except as described above    Vital Signs Last 24 Hrs  T(C): 36.9 (03 Jul 2017 08:00), Max: 36.9 (02 Jul 2017 23:53)  T(F): 98.5 (03 Jul 2017 08:00), Max: 98.5 (02 Jul 2017 23:53)  HR: 91 (03 Jul 2017 08:00) (77 - 91)  BP: 96/56 (03 Jul 2017 08:00) (96/56 - 124/84)  BP(mean): --  RR: 18 (03 Jul 2017 08:00) (16 - 18)  SpO2: 96% (03 Jul 2017 08:00) (96% - 100%)    I&O's Summary    02 Jul 2017 07:01  -  03 Jul 2017 07:00  --------------------------------------------------------  IN: 660 mL / OUT: 550 mL / NET: 110 mL        Telemetry past 24h:    PHYSICAL EXAM:    Constitutional: well-nourished, well-developed, NAD   HEENT:  MMM, sclerae anicteric, conjunctivae clear, no oral cyanosis.  Pulmonary: Non-labored, breath sounds are clear bilaterally, No wheezing, rales or rhonchi  Cardiovascular: Regular, S1 and S2, No murmurs, rubs, gallops or clicks  Gastrointestinal: Bowel Sounds present, soft, nontender.   Lymph: No peripheral edema. No lymphadenopathy.  Neurological: Alert, no focal deficits  Skin: No rashes.  Psych:  Mood & affect appropriate    LABS: All Labs Reviewed:                        12.3   8.1   )-----------( 254      ( 03 Jul 2017 07:10 )             35.7                         12.4   10.1  )-----------( 211      ( 02 Jul 2017 08:20 )             35.9                         12.5   10.2  )-----------( 212      ( 01 Jul 2017 16:49 )             36.2     03 Jul 2017 07:10    137    |  101    |  10     ----------------------------<  108    3.9     |  27     |  0.72   02 Jul 2017 08:20    137    |  102    |  10     ----------------------------<  100    4.1     |  26     |  0.72   01 Jul 2017 16:49    136    |  101    |  12     ----------------------------<  105    3.7     |  29     |  0.77     Ca    8.9        03 Jul 2017 07:10  Ca    8.5        02 Jul 2017 08:20  Ca    8.5        01 Jul 2017 16:49  Phos  2.2       01 Jul 2017 16:49  Mg     1.9       01 Jul 2017 16:49        CARDIAC MARKERS ( 02 Jul 2017 08:20 )  <.015 ng/mL / x     / x     / x     / x      CARDIAC MARKERS ( 01 Jul 2017 23:34 )  <.015 ng/mL / x     / x     / x     / x      CARDIAC MARKERS ( 01 Jul 2017 16:49 )  <.015 ng/mL / x     / 695 U/L / x     / 0.7 ng/mL      Blood Culture:         RADIOLOGY:    EKG:

## 2017-07-06 DIAGNOSIS — R07.89 OTHER CHEST PAIN: ICD-10-CM

## 2017-07-06 DIAGNOSIS — M16.11 UNILATERAL PRIMARY OSTEOARTHRITIS, RIGHT HIP: ICD-10-CM

## 2017-07-06 DIAGNOSIS — F17.210 NICOTINE DEPENDENCE, CIGARETTES, UNCOMPLICATED: ICD-10-CM

## 2017-11-10 ENCOUNTER — OUTPATIENT (OUTPATIENT)
Dept: OUTPATIENT SERVICES | Facility: HOSPITAL | Age: 62
LOS: 1 days | End: 2017-11-10
Payer: COMMERCIAL

## 2017-11-10 VITALS
SYSTOLIC BLOOD PRESSURE: 143 MMHG | DIASTOLIC BLOOD PRESSURE: 83 MMHG | TEMPERATURE: 98 F | HEART RATE: 86 BPM | HEIGHT: 70 IN | WEIGHT: 216.05 LBS

## 2017-11-10 DIAGNOSIS — M75.41 IMPINGEMENT SYNDROME OF RIGHT SHOULDER: ICD-10-CM

## 2017-11-10 DIAGNOSIS — M75.42 IMPINGEMENT SYNDROME OF LEFT SHOULDER: ICD-10-CM

## 2017-11-10 DIAGNOSIS — Z86.69 PERSONAL HISTORY OF OTHER DISEASES OF THE NERVOUS SYSTEM AND SENSE ORGANS: Chronic | ICD-10-CM

## 2017-11-10 DIAGNOSIS — Z90.49 ACQUIRED ABSENCE OF OTHER SPECIFIED PARTS OF DIGESTIVE TRACT: Chronic | ICD-10-CM

## 2017-11-10 DIAGNOSIS — Z01.818 ENCOUNTER FOR OTHER PREPROCEDURAL EXAMINATION: ICD-10-CM

## 2017-11-10 DIAGNOSIS — Z90.89 ACQUIRED ABSENCE OF OTHER ORGANS: Chronic | ICD-10-CM

## 2017-11-10 DIAGNOSIS — Z96.641 PRESENCE OF RIGHT ARTIFICIAL HIP JOINT: Chronic | ICD-10-CM

## 2017-11-10 DIAGNOSIS — M75.102 UNSPECIFIED ROTATOR CUFF TEAR OR RUPTURE OF LEFT SHOULDER, NOT SPECIFIED AS TRAUMATIC: ICD-10-CM

## 2017-11-10 LAB
ALBUMIN SERPL ELPH-MCNC: 3.7 G/DL — SIGNIFICANT CHANGE UP (ref 3.3–5)
ALP SERPL-CCNC: 98 U/L — SIGNIFICANT CHANGE UP (ref 40–120)
ALT FLD-CCNC: 21 U/L — SIGNIFICANT CHANGE UP (ref 12–78)
ANION GAP SERPL CALC-SCNC: 12 MMOL/L — SIGNIFICANT CHANGE UP (ref 5–17)
AST SERPL-CCNC: 12 U/L — LOW (ref 15–37)
BILIRUB SERPL-MCNC: 0.4 MG/DL — SIGNIFICANT CHANGE UP (ref 0.2–1.2)
BUN SERPL-MCNC: 11 MG/DL — SIGNIFICANT CHANGE UP (ref 7–23)
CALCIUM SERPL-MCNC: 8.6 MG/DL — SIGNIFICANT CHANGE UP (ref 8.5–10.1)
CHLORIDE SERPL-SCNC: 104 MMOL/L — SIGNIFICANT CHANGE UP (ref 96–108)
CO2 SERPL-SCNC: 23 MMOL/L — SIGNIFICANT CHANGE UP (ref 22–31)
CREAT SERPL-MCNC: 0.87 MG/DL — SIGNIFICANT CHANGE UP (ref 0.5–1.3)
GLUCOSE SERPL-MCNC: 101 MG/DL — HIGH (ref 70–99)
HCT VFR BLD CALC: 46.6 % — SIGNIFICANT CHANGE UP (ref 39–50)
HGB BLD-MCNC: 15.4 G/DL — SIGNIFICANT CHANGE UP (ref 13–17)
MCHC RBC-ENTMCNC: 32.9 GM/DL — SIGNIFICANT CHANGE UP (ref 32–36)
MCHC RBC-ENTMCNC: 33.6 PG — SIGNIFICANT CHANGE UP (ref 27–34)
MCV RBC AUTO: 101.9 FL — HIGH (ref 80–100)
PLATELET # BLD AUTO: 271 K/UL — SIGNIFICANT CHANGE UP (ref 150–400)
POTASSIUM SERPL-MCNC: 4 MMOL/L — SIGNIFICANT CHANGE UP (ref 3.5–5.3)
POTASSIUM SERPL-SCNC: 4 MMOL/L — SIGNIFICANT CHANGE UP (ref 3.5–5.3)
PROT SERPL-MCNC: 7.2 G/DL — SIGNIFICANT CHANGE UP (ref 6–8.3)
RBC # BLD: 4.58 M/UL — SIGNIFICANT CHANGE UP (ref 4.2–5.8)
RBC # FLD: 12.2 % — SIGNIFICANT CHANGE UP (ref 10.3–14.5)
SODIUM SERPL-SCNC: 139 MMOL/L — SIGNIFICANT CHANGE UP (ref 135–145)
WBC # BLD: 6.9 K/UL — SIGNIFICANT CHANGE UP (ref 3.8–10.5)
WBC # FLD AUTO: 6.9 K/UL — SIGNIFICANT CHANGE UP (ref 3.8–10.5)

## 2017-11-10 PROCEDURE — 36415 COLL VENOUS BLD VENIPUNCTURE: CPT

## 2017-11-10 PROCEDURE — 80053 COMPREHEN METABOLIC PANEL: CPT

## 2017-11-10 PROCEDURE — 93010 ELECTROCARDIOGRAM REPORT: CPT | Mod: NC

## 2017-11-10 PROCEDURE — G0463: CPT

## 2017-11-10 PROCEDURE — 85027 COMPLETE CBC AUTOMATED: CPT

## 2017-11-10 PROCEDURE — 93005 ELECTROCARDIOGRAM TRACING: CPT

## 2017-11-10 RX ORDER — GARLIC 1000 MG
1 CAPSULE ORAL
Qty: 0 | Refills: 0 | COMMUNITY

## 2017-11-10 RX ORDER — ENOXAPARIN SODIUM 100 MG/ML
1 INJECTION SUBCUTANEOUS
Qty: 0 | Refills: 0 | COMMUNITY

## 2017-11-10 NOTE — H&P PST ADULT - OCCUPATION
at [employer] - (notes currently out on disability secondary to torn rotator cuff)

## 2017-11-10 NOTE — H&P PST ADULT - PSH
H/O cataract    History of total hip replacement, right    S/P appendectomy  Ruptured Appendix 1975  S/P tonsillectomy

## 2017-11-10 NOTE — H&P PST ADULT - HISTORY OF PRESENT ILLNESS
This 63 yo male presents to PST for scheduled Left shoulder Acromioclavicular decompression, manipulations with Dr. Smalls on 11/16/17. I injured it at work 3/16/17 putting chicken into an oven. I felt a pop. I had an MRI and they found a tear. He underwent 80 sessions of PT, Cortisone injection. He is using Diclofenac and Tylenol for pain. He rates 5-6/10 on a PS. It is achy in nature. He has decreased ROM and strength from the pain.

## 2017-11-10 NOTE — H&P PST ADULT - PROBLEM SELECTOR PLAN 1
PST labs: CBC, CMP.  No medical evaluation required.  Avoid all NSAID/ASA containing products as well as all herbal/vitamin supplements.  Preoperative instruction/CHD cleanse reviewed with patient who verbalized understanding. PST labs: CBC, CMP.  Medical evaluation with Dr. Swain  Avoid all NSAID/ASA containing products as well as all herbal/vitamin supplements.  Preoperative instruction/CHD cleanse reviewed with patient who verbalized understanding.

## 2017-11-15 RX ORDER — SODIUM CHLORIDE 9 MG/ML
1000 INJECTION, SOLUTION INTRAVENOUS
Qty: 0 | Refills: 0 | Status: DISCONTINUED | OUTPATIENT
Start: 2017-11-16 | End: 2017-11-16

## 2017-11-16 ENCOUNTER — OUTPATIENT (OUTPATIENT)
Dept: OUTPATIENT SERVICES | Facility: HOSPITAL | Age: 62
LOS: 1 days | End: 2017-11-16
Payer: COMMERCIAL

## 2017-11-16 VITALS
HEART RATE: 77 BPM | RESPIRATION RATE: 16 BRPM | SYSTOLIC BLOOD PRESSURE: 128 MMHG | OXYGEN SATURATION: 96 % | DIASTOLIC BLOOD PRESSURE: 88 MMHG

## 2017-11-16 VITALS
SYSTOLIC BLOOD PRESSURE: 155 MMHG | WEIGHT: 210.1 LBS | HEIGHT: 70 IN | HEART RATE: 77 BPM | OXYGEN SATURATION: 97 % | RESPIRATION RATE: 16 BRPM | TEMPERATURE: 98 F | DIASTOLIC BLOOD PRESSURE: 92 MMHG

## 2017-11-16 DIAGNOSIS — Z96.641 PRESENCE OF RIGHT ARTIFICIAL HIP JOINT: Chronic | ICD-10-CM

## 2017-11-16 DIAGNOSIS — Z86.69 PERSONAL HISTORY OF OTHER DISEASES OF THE NERVOUS SYSTEM AND SENSE ORGANS: Chronic | ICD-10-CM

## 2017-11-16 DIAGNOSIS — Z90.89 ACQUIRED ABSENCE OF OTHER ORGANS: Chronic | ICD-10-CM

## 2017-11-16 DIAGNOSIS — M75.42 IMPINGEMENT SYNDROME OF LEFT SHOULDER: ICD-10-CM

## 2017-11-16 DIAGNOSIS — Z01.818 ENCOUNTER FOR OTHER PREPROCEDURAL EXAMINATION: ICD-10-CM

## 2017-11-16 DIAGNOSIS — M75.41 IMPINGEMENT SYNDROME OF RIGHT SHOULDER: ICD-10-CM

## 2017-11-16 DIAGNOSIS — Z90.49 ACQUIRED ABSENCE OF OTHER SPECIFIED PARTS OF DIGESTIVE TRACT: Chronic | ICD-10-CM

## 2017-11-16 PROCEDURE — 23130 ACROMP/ACROMIONECTOMY PRTL: CPT | Mod: LT

## 2017-11-16 RX ORDER — CEFAZOLIN SODIUM 1 G
2000 VIAL (EA) INJECTION ONCE
Qty: 0 | Refills: 0 | Status: COMPLETED | OUTPATIENT
Start: 2017-11-16 | End: 2017-11-16

## 2017-11-16 RX ORDER — DICLOFENAC SODIUM 75 MG/1
1 TABLET, DELAYED RELEASE ORAL
Qty: 0 | Refills: 0 | COMMUNITY

## 2017-11-16 RX ORDER — SODIUM CHLORIDE 9 MG/ML
1000 INJECTION, SOLUTION INTRAVENOUS
Qty: 0 | Refills: 0 | Status: DISCONTINUED | OUTPATIENT
Start: 2017-11-16 | End: 2017-11-16

## 2017-11-16 RX ORDER — SODIUM CHLORIDE 9 MG/ML
1000 INJECTION, SOLUTION INTRAVENOUS
Qty: 0 | Refills: 0 | Status: DISCONTINUED | OUTPATIENT
Start: 2017-11-16 | End: 2017-12-01

## 2017-11-16 RX ORDER — OXYCODONE HYDROCHLORIDE 5 MG/1
5 TABLET ORAL EVERY 4 HOURS
Qty: 0 | Refills: 0 | Status: DISCONTINUED | OUTPATIENT
Start: 2017-11-16 | End: 2017-11-16

## 2017-11-16 RX ORDER — ONDANSETRON 8 MG/1
4 TABLET, FILM COATED ORAL ONCE
Qty: 0 | Refills: 0 | Status: DISCONTINUED | OUTPATIENT
Start: 2017-11-16 | End: 2017-11-16

## 2017-11-16 RX ORDER — HYDROMORPHONE HYDROCHLORIDE 2 MG/ML
0.5 INJECTION INTRAMUSCULAR; INTRAVENOUS; SUBCUTANEOUS
Qty: 0 | Refills: 0 | Status: DISCONTINUED | OUTPATIENT
Start: 2017-11-16 | End: 2017-11-16

## 2017-11-16 RX ORDER — MULTIVIT-MIN/FERROUS GLUCONATE 9 MG/15 ML
1 LIQUID (ML) ORAL
Qty: 0 | Refills: 0 | COMMUNITY

## 2017-11-16 RX ORDER — ACETAMINOPHEN 500 MG
1000 TABLET ORAL ONCE
Qty: 0 | Refills: 0 | Status: COMPLETED | OUTPATIENT
Start: 2017-11-16 | End: 2017-11-16

## 2017-11-16 RX ADMIN — HYDROMORPHONE HYDROCHLORIDE 0.5 MILLIGRAM(S): 2 INJECTION INTRAMUSCULAR; INTRAVENOUS; SUBCUTANEOUS at 15:06

## 2017-11-16 RX ADMIN — HYDROMORPHONE HYDROCHLORIDE 0.5 MILLIGRAM(S): 2 INJECTION INTRAMUSCULAR; INTRAVENOUS; SUBCUTANEOUS at 16:13

## 2017-11-16 RX ADMIN — SODIUM CHLORIDE 40 MILLILITER(S): 9 INJECTION, SOLUTION INTRAVENOUS at 12:35

## 2017-11-16 RX ADMIN — SODIUM CHLORIDE 75 MILLILITER(S): 9 INJECTION, SOLUTION INTRAVENOUS at 14:50

## 2017-11-16 RX ADMIN — HYDROMORPHONE HYDROCHLORIDE 0.5 MILLIGRAM(S): 2 INJECTION INTRAMUSCULAR; INTRAVENOUS; SUBCUTANEOUS at 14:49

## 2017-11-16 NOTE — ASU DISCHARGE PLAN (ADULT/PEDIATRIC). - MEDICATION SUMMARY - MEDICATIONS TO TAKE
I will START or STAY ON the medications listed below when I get home from the hospital:    oxyCODONE-acetaminophen 5 mg-325 mg oral tablet  -- 1 tab(s) by mouth every 4 hours, As Needed -for severe pain MDD:6  -- Caution federal law prohibits the transfer of this drug to any person other  than the person for whom it was prescribed.  May cause drowsiness.  Alcohol may intensify this effect.  Use care when operating dangerous machinery.  This prescription cannot be refilled.  This product contains acetaminophen.  Do not use  with any other product containing acetaminophen to prevent possible liver damage.  Using more of this medication than prescribed may cause serious breathing problems.    -- Indication: For Pain    diclofenac potassium 50 mg oral tablet  -- 1 tab(s) by mouth 3 times a day as needed  -- Indication: For prior med    Centrum oral tablet  -- 1 tab(s) by mouth once a day  -- Indication: For prior med

## 2017-11-16 NOTE — BRIEF OPERATIVE NOTE - PROCEDURE
<<-----Click on this checkbox to enter Procedure Subacromial decompression  11/16/2017    Active  NILSAC

## 2017-11-16 NOTE — ASU DISCHARGE PLAN (ADULT/PEDIATRIC). - ACTIVITY LEVEL
no exercise/Non weight bearing left upper extremity in sling no heavy lifting/no exercise/no sports/gym/no weight bearing/Non weight bearing left upper extremity in sling

## 2017-11-16 NOTE — ASU DISCHARGE PLAN (ADULT/PEDIATRIC). - NOTIFY
Numbness, tingling/Bleeding that does not stop/Numbness, color, or temperature change to extremity/Pain not relieved by Medications Numbness, color, or temperature change to extremity/Pain not relieved by Medications/Bleeding that does not stop/Fever greater than 101/Increased Irritability or Sluggishness/Numbness, tingling

## 2017-12-05 NOTE — PATIENT PROFILE ADULT. - HOW PATIENT ADDRESSED, PROFILE
Physical Therapy Evaluation    Patient Name:  Windy Lindo   MRN:  [id number]    Recommendations:     Discharge recommendations: Home Health PT  Barriers to discharge: Inaccessible home environment  Equipment recommendations: rolling walker, bedside commode and shower chair    Assessment:     Windy Lindo is a 68 y.o. female admitted with a medical diagnosis of R TKA.  She presents with the below impairments/functional limitations.  Pt tolerated evaluation well today and is motivated to do well with recent joint replacement.  Pt is a good candidate for skilled PT services to address the below deficits and to increase functional independence.      Rehab Prognosis: good; patient would benefit from acute skilled PT services to address these deficits and reach maximum level of function.      Rehab Identified impairments/functional limitations:   weakness, impaired endurance, impaired sensation, impaired self care skills, impaired functional mobilty, gait instability, impaired balance, decreased coordination, decreased lower extremity function, decreased safety awareness, pain, decreased ROM, impaired skin, edema, orthopedic precautions    Recent Surgery: Procedure(s) (LRB):  REPLACEMENT-KNEE-TOTAL (Right) Day of Surgery    Plan:     During this hospitalization, patient to be seen BID to address the above listed problems via gait training, therapeutic activity, therapeutic exercise, and neuromuscular re-education   Plan of Care Reviewed with: patient    Subjective     Communicated with RN prior to session.  Patient found supine upon PT entry to room, agreeable to evaluation.      Chief Complaint: none  Patient comments/goals: to return home safely    Pain/Comfort:  Pain level prior: 3/10 in R knee  Pain level post: 3/10 in R knee    Patients cultural, spiritual, Gnosticist conflicts given the current situation: none stated    Living Environment:  Pt lives alone, but will be receiving assistance from a friend upon  discharge.  Pt has 4 ANDERSON with B railing.      Patient has the following equipment: none      Upon discharge, patient will have assistance from friend.    Objective:     Patient found supine in bed with blood pressure cuff, telemetry, perineural catheter, peripheral IV, pulse ox, and FCD.     General Precautions: Standard, fall  Orthopedic Precautions:  RLE weight bearing as tolerated  Braces: none     Physical Exam:  Postural examination/scapula alignment: WFL    Skin integrity: Visible skin intact  Edema: Mild RLE    Sensation:   Intact    Lower Extremity Range of Motion:  Right Lower Extremity: WFL except knee  Left Lower Extremity: WFL     Lower Extremity Strength:  Right Lower Extremity: WFL except knee  Left Lower Extremity: WFL     Functional Mobility:    Bed Mobility:    Supine to sit: Min A   Sit to supine: Min A     Transfers:    Sit<>Stand: Min A with SW    Ambulation:    Pt ambulated 3 steps forward and backward and 1-2 sidesteps with SW and Min A.  Pt educated on 3 point gait pattern.  Pt able to verbalize and demonstrate understanding.       AM-PAC 6 CLICK MOBILITY  Total Score: 18     Therapeutic Activities and Exercises:  PT evaluation performed.  Pt educated on role of PT, safety with functional mobility.     Patient left supine in bed with all lines intact and RN notified.    GOALS:    Physical Therapy Goals        Problem: Physical Therapy Goal    Goal Priority Disciplines Outcome Goal Variances Interventions   Physical Therapy Goal     PT/OT, PT Ongoing (interventions implemented as appropriate)     Description:  Goals to be met by: 17     Patient will increase functional independence with mobility by performin. Supine to sit with Supervision  2. Sit to supine with Supervision   3. Sit to stand transfer with Stand-by Assistance  4. Gait  x 150 feet with Stand-by Assistance using Rolling Walker.   5. Ascend/descend 4 stair with bilateral Handrails Contact Guard Assistance    6. Lower  extremity exercise program x 30 reps per handout, with independence                      History:     Past Medical History:   Diagnosis Date    Arthritis     Eye injury 4 yrs    hit od       Past Surgical History:   Procedure Laterality Date    APPENDECTOMY      COLONOSCOPY N/A 9/7/2017    Procedure: COLONOSCOPY;  Surgeon: Albino Fenton MD;  Location: [address]);  Service: Endoscopy;  Laterality: N/A;    ESOPHAGOGASTRODUODENOSCOPY  09/07/2017    LASIK  7 yrs    ou    TONSILLECTOMY         Time Tracking:     PT Received On: 12/05/17  PT Start Time: 1025     PT Stop Time: 1045  PT Total Time (min): 20 min     Billable Minutes: Evaluation 12; Therapeutic Activity 8      Jarett Dudley, PT, DPT  12/5/2017   [phone]           [place]

## 2018-05-06 NOTE — H&P PST ADULT - EXTREMITIES
Patient Education     Extremity Laceration: Sutures, Staples, or Tape  A laceration is a cut through the skin. If it is deep, it may require stitches (sutures) or staples to close so it can heal. Minor cuts may be treated with surgical tape closures.   X-rays may be done if something may have entered the skin through the cut. You may also need a tetanus shot if you are not up to date on this vaccination.  Home care  · Follow the health care provider’s instructions on how to care for the cut.  · Wash your hands with soap and warm water before and after caring for your wound. This is to help prevent infection.  · Keep the wound clean and dry. If a bandage was applied and it becomes wet or dirty, replace it. Otherwise, leave it in place for the first 24 hours, then change it once a day or as directed.  · If sutures or staples were used, clean the wound daily:  · After removing the bandage, wash the area with soap and water. Use a wet cotton swab to loosen and remove any blood or crust that forms.  · After cleaning, keep the wound clean and dry. Talk with your doctor before applying any antibiotic ointment to the wound. Reapply the bandage.  · You may remove the bandage to shower as usual after the first 24 hours, but do not soak the area in water (no swimming) until the stitches or staples are removed.  · If surgical tape closures were used, keep the area clean and dry. If it becomes wet, blot it dry with a towel.  · The doctor may prescribe an antibiotic cream or ointment to prevent infection. Do not stop taking this medication until you have finished the prescribed course or the doctor tells you to stop. The doctor may also prescribe medications for pain. Follow the doctor’s instructions for taking these medications.  · Avoid activities that may reopen your wound.  Follow-up care  Follow up with your health care provider. Most skin wounds heal within ten days. However, an infection may sometimes occur despite proper  treatment. Therefore, check the wound daily for the signs of infection listed below. Stitches and staples should be removed within 10 days. If surgical tape closures were used, you may remove them after 10 days if they have not fallen off by then.   When to seek medical advice  Call your health care provider right away if any of these occur:  · Wound bleeding not controlled by direct pressure  · Signs of infection, including increasing pain in the wound, increasing wound redness or swelling, or pus or bad odor coming from the wound  · Fever of 100.4°F (38ºC) or higher or as directed by your healthcare provider  · Stitches or staples come apart or fall out or surgical tape falls off before 7 days  · Wound edges re-open  · Wound changes colors  · Numbness around the wound   · Decreased movement around the injured area  © 5476-7871 [employer]. [address] [place] [address], [place] [address]. All rights reserved. This information is not intended as a substitute for professional medical care. Always follow your healthcare professional's instructions.            detailed exam

## 2021-11-26 NOTE — ASU PATIENT PROFILE, ADULT - BRADEN SCORE (IF 18 OR LESS ACTIVATE SKIN INJURY RISK INCREASED GUIDELINE), MLM
[facility] ENCOUNTER        Pt Name: Gemma Gamboa  MRN: [id number]  [place] 2002  Date of evaluation: 11/26/2021  Provider: DELTA Nunez CNP  PCP: No primary care provider on file. Note Started: 5:23 PM EST       DECLAN. I have evaluated this patient. My supervising physician was available for consultation. CHIEF COMPLAINT       STD exposure    HISTORY OF PRESENT ILLNESS   (Location, Timing/Onset, Context/Setting, Quality, Duration, Modifying Factors, Severity, Associated Signs and Symptoms)  Note limiting factors. Chief Complaint: STD exposure    Gemma Gamboa is a 25 y.o. male who presents to the emergency department today reporting STD exposure. Patient states that he was notified by his girlfriend that he should come be checked and treated for STDs. Patient states that he is unsure what his girlfriend tested positive for. He denies having any symptoms. Patient denies abdominal pain. He denies urinary frequency, dysuria, hematuria. He denies having any penile discharge. Patient denies any lesions or sores to his genitals. Patient states that he is otherwise felt well and has been without fever, chills, or other symptoms. Nursing Notes were all reviewed and agreed with or any disagreements were addressed in the HPI. REVIEW OF SYSTEMS    (2-9 systems for level 4, 10 or more for level 5)     Review of Systems   Constitutional: Negative for chills, fatigue and fever. HENT: Negative for congestion and sore throat. Eyes: Negative for visual disturbance. Respiratory: Negative for chest tightness, shortness of breath and wheezing. Cardiovascular: Negative for chest pain and palpitations. Gastrointestinal: Negative for nausea and vomiting. Endocrine: Negative for polydipsia and polyuria.    Genitourinary: Negative for decreased urine volume, difficulty urinating, dysuria, genital sores, penile discharge, penile pain, testicular pain and urgency. Musculoskeletal: Negative for back pain, neck pain and neck stiffness. Skin: Negative for rash and wound. Neurological: Negative for dizziness, weakness and light-headedness. Hematological: Does not bruise/bleed easily. Psychiatric/Behavioral: Negative for suicidal ideas. Positives and Pertinent negatives as per HPI. Except as noted above in the ROS, all other systems were reviewed and negative. PAST MEDICAL HISTORY   No past medical history on file. SURGICAL HISTORY   No past surgical history on file. CURRENTMEDICATIONS       Previous Medications    No medications on file         ALLERGIES     Patient has no known allergies. FAMILYHISTORY     No family history on file. SOCIAL HISTORY       Social History     Tobacco Use    Smoking status: Never Smoker    Smokeless tobacco: Never Used   Substance Use Topics    Alcohol use: Never    Drug use: Never       SCREENINGS             PHYSICAL EXAM    (up to 7 for level 4, 8 or more for level 5)     ED Triage Vitals [11/26/21 1647]   BP Temp Temp Source Heart Rate Resp SpO2 Height Weight - Scale   137/73 98 °F (36.7 °C) Temporal 80 14 98 % 5' 3\" (1.6 m) 195 lb 15.8 oz (88.9 kg)       Physical Exam  Vitals and nursing note reviewed. Constitutional:       General: He is not in acute distress. Appearance: Normal appearance. He is not ill-appearing, toxic-appearing or diaphoretic. HENT:      Head: Normocephalic and atraumatic. Right Ear: External ear normal.      Left Ear: External ear normal.      Nose: Nose normal.      Mouth/Throat:      Mouth: Mucous membranes are moist.   Eyes:      General:         Right eye: No discharge. Left eye: No discharge. Extraocular Movements: Extraocular movements intact. Conjunctiva/sclera: Conjunctivae normal.   Cardiovascular:      Rate and Rhythm: Normal rate and regular rhythm. Pulses: Normal pulses.       Heart sounds: Normal heart sounds. Pulmonary:      Effort: Pulmonary effort is normal. No respiratory distress. Breath sounds: Normal breath sounds. Abdominal:      General: Bowel sounds are normal.      Palpations: Abdomen is soft. Tenderness: There is no abdominal tenderness. Genitourinary:     Comments: Deferred  Musculoskeletal:         General: Normal range of motion. Cervical back: Normal range of motion and neck supple. Skin:     General: Skin is warm and dry. Capillary Refill: Capillary refill takes less than 2 seconds. Neurological:      General: No focal deficit present. Mental Status: He is alert and oriented to person, place, and time. Psychiatric:         Mood and Affect: Mood normal.         Behavior: Behavior normal.         Thought Content: Thought content normal.         Judgment: Judgment normal.         DIAGNOSTIC RESULTS   LABS:    Labs Reviewed   Kerney Slice DNA, URINE   URINE TRICHOMONAS EVALUATION    Narrative:     Performed at:  [facility]  [address] [facility]   Phone [phone]       When ordered only abnormal lab results are displayed. All other labs were within normal range or not returned as of this dictation. EKG: When ordered, EKG's are interpreted by the Emergency Department Physician in the absence of a cardiologist.  Please see their note for interpretation of EKG. RADIOLOGY:   Non-plain film images such as CT, Ultrasound and MRI are read by the radiologist. Plain radiographic images are visualized and preliminarily interpreted by the ED Provider with the below findings:        Interpretation per the Radiologist below, if available at the time of this note:    No orders to display     No results found.         PROCEDURES   Unless otherwise noted below, none     Procedures    CRITICAL CARE TIME   N/A    CONSULTS:  None      EMERGENCY DEPARTMENT COURSE and DIFFERENTIAL DIAGNOSIS/MDM:   Vitals:    Vitals:    11/26/21 1647   BP: 137/73   Pulse: 80   Resp: 14   Temp: 98 °F (36.7 °C)   TempSrc: Temporal   SpO2: 98%   Weight: 195 lb 15.8 oz (88.9 kg)   Height: 5' 3\" (1.6 m)       Patient was given the following medications:  Medications   doxycycline hyclate (VIBRA-TABS) tablet 100 mg (has no administration in time range)   azithromycin (ZITHROMAX) tablet 1,000 mg (has no administration in time range)         Previous records reviewed in order to gain further information guarding patient's PMH as well as his HPI. Nursing notes reviewed. This is an 25year-old otherwise healthy male who presents reporting STD exposure. Patient states that his significant other tested positive for an unknown STD and was told to notify her sexual partners of a 2 may be treated. Physical exam complete. Patient is nontoxic, afebrile, normotensive. No signs or symptoms of acute distress noted. Patient negative for trichomonas. Gonorrhea and Chlamydia testing are pending. Patient does request empiric treatment. Patient medicated with Zithromax and doxycycline per CDC guidelines. Patient counseled on the importance of safe sex practices. He was advised refrain from any sexual intercourse for the next 14 days and to notify any of his sexual partners. At this time there is no evidence of any life-threatening or emergent conditions requiring immediate intervention. Patient is discharged with emphasis on close outpatient follow-up. Prescription for doxycycline provided. He agrees to follow-up as directed. He agrees return for high fever, incessant vomiting, severe pain, any other worsening symptoms. He is discharged home in stable condition. FINAL IMPRESSION      1.  STD exposure          DISPOSITION/PLAN   DISPOSITION Decision To Discharge 11/26/2021 05:57:16 PM      PATIENT REFERRED TO:  [facility]) Physicians Pre-Service  [phone]  In 2 days  to establish primary care      DISCHARGE MEDICATIONS:  New Prescriptions    DOXYCYCLINE HYCLATE (VIBRA-TABS) 100 MG TABLET    Take 1 tablet by mouth 2 times daily for 7 days       DISCONTINUED MEDICATIONS:  Discontinued Medications    No medications on file              (Please note that portions of this note were completed with a voice recognition program.  Efforts were made to edit the dictations but occasionally words are mis-transcribed.)    DELTA Baptiste CNP (electronically signed)            DELTA Baptiste CNP  11/26/21 [id number]

## 2024-06-12 NOTE — PHYSICAL THERAPY INITIAL EVALUATION ADULT - MD ORDER
----- Message from Blaine Rinaldi sent at 6/11/2024  3:52 PM CDT -----  .Who Called: Luis Branham    Refill or New Rx:Refill  RX Name and Strength:lisinopriL-hydrochlorothiazide (PRINZIDE,ZESTORETIC) 20-25 mg Tab  How is the patient currently taking it? (ex. 1XDay):Take 1 tablet by mouth once daily. - Oral  Is this a 30 day or 90 day RX:90 day  Local or Mail Order:local  List of preferred pharmacies on file (remove unneeded): [id number] AT [address] AT   Ordering Provider:      Preferred Method of Contact: Phone Call  Patient's Preferred Phone Number on File: [phone]   Best Call Back Number, if different:  Additional Information: pot is requesting a smaller dosage   out of bed, weight-bearing as tolerated

## 2024-12-02 NOTE — DISCHARGE NOTE ADULT - NS AS ACTIVITY OBS
PRE-OP DIAGNOSIS:  Colonic mass 02-Dec-2024 19:28:59  Karen Penaloza   No Heavy lifting/straining/Do not drive or operate machinery/Walking-Outdoors allowed/Walking-Indoors allowed

## 2025-05-27 NOTE — ASU DISCHARGE PLAN (ADULT/PEDIATRIC). - PAIN
Lab Results   Component Value Date    HGBA1C 10.4 (H) 05/25/2025       Recent Labs     05/26/25  1106 05/26/25  1612 05/26/25 2040 05/27/25  0804   POCGLU 302* 176* 213* 289*       Blood Sugar Average: Last 72 hrs:  (P) [id number]     prescription called to pharmacy